# Patient Record
(demographics unavailable — no encounter records)

---

## 2025-05-27 NOTE — PROCEDURE
[Outpatient] : Outpatient [Ambulatory] : Patient is ambulatory. [____] : Pre-Dive: Time - [unfilled] [___] : Pre-Dive: Value - [unfilled] mg/dL [Patient demonstrated and verbalized proper technique for using air break mask] : Patient demonstrated and verbalized proper technique for using air break mask [Patient educated on the risks of SMOKING prior to HBOT with understanding] : Patient educated on the risks of SMOKING prior to HBOT with understanding [Patient educated on the risks of CONSUMING ALCOHOL prior to HBOT with understanding] : Patient educated on the risks of CONSUMING ALCOHOL prior to HBOT with understanding [100% Cotton] : 100% cotton [Empty all pockets] : empty all pockets [No hair oils, wigs, hairpieces, pins] : no hair oils, wigs, hairpieces, pins  [Pre tx medications] : pre tx medications  [No make-up, creams] : no make-up, creams  [No jewelry] : no jewelry  [No matches, cigarettes, lighters] : no matches, cigarettes, lighters  [Hearing aid removed] : hearing aid removed [Dentures removed] : dentures removed [Ground bracelet on pt's wrist] : ground bracelet on pt's wrist  [Contacts removed] : contacts removed  [Remove nail polish] : remove nail polish  [No reading material] : no reading material  [Bra, undergarments removed] : bra, undergarments removed  [No contraindicated dressings] : no contraindicated dressings [Ground Wire - VISUAL Verification - Intact/Free of Obstruction] : Ground Wire - VISUAL Verification - Intact/Free of Obstruction  [THIS CHAMBER HAS BEEN CLEANED / DISINFECTED] : This chamber has been cleaned / disinfected according to local and hospital policy and procedure prior to this treatment. [Number: ___] : Number: [unfilled] [Diagnosis: ___] : Diagnosis: [unfilled] [Clear all fields] : clear all fields [] : No [FreeTextEntry4] : 100 [FreeTextEntry6] : 4440 [FreeTextEntry8] : 9350 [de-identified] : 5800 [de-identified] : 4366 [de-identified] : 8082 [de-identified] : 6229 [de-identified] : 5372 [de-identified] : 0943 [de-identified] : 120 [de-identified] : MELANIE

## 2025-05-27 NOTE — PROCEDURE
[Outpatient] : Outpatient [Ambulatory] : Patient is ambulatory. [____] : Pre-Dive: Time - [unfilled] [___] : Pre-Dive: Value - [unfilled] mg/dL [Patient demonstrated and verbalized proper technique for using air break mask] : Patient demonstrated and verbalized proper technique for using air break mask [Patient educated on the risks of SMOKING prior to HBOT with understanding] : Patient educated on the risks of SMOKING prior to HBOT with understanding [Patient educated on the risks of CONSUMING ALCOHOL prior to HBOT with understanding] : Patient educated on the risks of CONSUMING ALCOHOL prior to HBOT with understanding [100% Cotton] : 100% cotton [Empty all pockets] : empty all pockets [No hair oils, wigs, hairpieces, pins] : no hair oils, wigs, hairpieces, pins  [Pre tx medications] : pre tx medications  [No make-up, creams] : no make-up, creams  [No jewelry] : no jewelry  [No matches, cigarettes, lighters] : no matches, cigarettes, lighters  [Hearing aid removed] : hearing aid removed [Dentures removed] : dentures removed [Ground bracelet on pt's wrist] : ground bracelet on pt's wrist  [Contacts removed] : contacts removed  [Remove nail polish] : remove nail polish  [No reading material] : no reading material  [Bra, undergarments removed] : bra, undergarments removed  [No contraindicated dressings] : no contraindicated dressings [Ground Wire - VISUAL Verification - Intact/Free of Obstruction] : Ground Wire - VISUAL Verification - Intact/Free of Obstruction  [THIS CHAMBER HAS BEEN CLEANED / DISINFECTED] : This chamber has been cleaned / disinfected according to local and hospital policy and procedure prior to this treatment. [Number: ___] : Number: [unfilled] [Diagnosis: ___] : Diagnosis: [unfilled] [Clear all fields] : clear all fields [] : No [FreeTextEntry4] : 100 [FreeTextEntry6] : 7480 [FreeTextEntry8] : 7920 [de-identified] : 3617 [de-identified] : 5820 [de-identified] : 7882 [de-identified] : 3719 [de-identified] : 0738 [de-identified] : 0152 [de-identified] : 120 [de-identified] : MELANIE

## 2025-05-27 NOTE — ADDENDUM
[FreeTextEntry1] : PT ALERT AND ENTERED INTO HYPERBARIC SUITE PT ORDER VERIFIED PRIOR TO TREATMENT PT CONTRAINDICATION LIST AND PRE-DIVE SAFETY CHECK DONE AND VERIFIED PRIOR TO TREATMENT BY TWO CHT PT GROUND BRACLET VERIFIED PLUGGED IN BY TWO TECHNICIANS PT VITALS WERE WITHIN MD DISCRETION FOR HBOT PT DENIES PAIN PT WAS PRESSURIZED TO 2.4 NAVARRO AT 2.2 PSI/MIN IN CHAMBER #4                       PT OBSERVED FOR FACIAL TWITCHING AND CHEST RISE BY CHT SEATED CHAMBERSIDE Patient Tolerated Both Air Breaks Well Patient Ascended to Surface Without Any Complications Patient Vitals Were Taken and Were Within Limits for Patient Post TX Patient Exited Hyperbaric Room Assisted by CHT

## 2025-05-28 NOTE — PHYSICAL EXAM
[Normal Thyroid] : the thyroid was normal [Normal Breath Sounds] : Normal breath sounds [Normal Heart Sounds] : normal heart sounds [Normal Rate and Rhythm] : normal rate and rhythm [Alert] : alert [Oriented to Person] : oriented to person [Oriented to Place] : oriented to place [Oriented to Time] : oriented to time [Calm] : calm [Purpura] : purpura [Abdominal Pad] : Abdominal Pad [JVD] : no jugular venous distention  [Abdomen Masses] : No abdominal massess [Abdomen Tenderness] : ~T ~M No abdominal tenderness [Tender] : nontender [Enlarged] : not enlarged [de-identified] : adult WF, NAD, alert, Ox3 [de-identified] : Ecchymosis surrounding the suture line [FreeTextEntry1] : Right chest- flap [FreeTextEntry2] : 20.5 [FreeTextEntry4] : well approximated edges [de-identified] : Ecchymosis [de-identified] : Unable to determine [de-identified] : ABD [de-identified] : Mechanically cleansed with 0.9& 5 normal saline and sterile 4x4 gauze Rolando Gupta drains in place Binder Bra [de-identified] : Left- midline suture with a dusky appearance and mild dehiscence. Ecchymosis surrounding the suture line [FreeTextEntry7] : Left chest -flap [FreeTextEntry8] : 22.0 [de-identified] : Lateral and medial suture line- well approximated Midline suture 0.1 [de-identified] : ecchymosis with slight dusky appearance [de-identified] : cannot determine [de-identified] : ABD [de-identified] : Mechanically cleansed with 0.9& 5 normal saline and sterile 4x4 gauze Rolando Gupta drains in place Binder Bra   [TWNoteComboBox4] : Small [TWNoteComboBox5] : No [TWNoteComboBox6] : Surgical [de-identified] : No [de-identified] : other [de-identified] : None [de-identified] : None [de-identified] : Daily [de-identified] : Other [de-identified] : Small [de-identified] : No [de-identified] : Surgical [de-identified] : No [de-identified] : other [de-identified] : None [de-identified] : None [de-identified] : Daily [de-identified] : Other

## 2025-05-28 NOTE — VITALS
[Pain related to present condition?] : The patient's  pain is related to present condition. [Aching] : aching [] : No [de-identified] : 2/10 [FreeTextEntry3] : Right and left breast [FreeTextEntry1] : support of the suture lines- Oxycodone [FreeTextEntry2] : nothing [FreeTextEntry5] : Medication reconciliation completed

## 2025-05-28 NOTE — ASSESSMENT
[Verbal] : Verbal [Written] : Written [Demo] : Demo [Patient] : Patient [Spouse] : Spouse [Verbalizes knowledge/Understanding] : Verbalizes knowledge/understanding [Dressing changes] : dressing changes [Skin Care] : skin care [Pressure relief] : pressure relief [Signs and symptoms of infection] : sign and symptoms of infection [Nutrition] : nutrition [How and When to Call] : how and when to call [Labs and Tests] : labs and tests [Pain Management] : pain management [Hyperbaric Therapy] : hyperbaric therapy [Patient responsibility to plan of care] : patient responsibility to plan of care [Glycemic Control] : glycemic control [] : Yes [Stable] : stable [Other: ____] : [unfilled] [Ambulatory] : Ambulatory [Not Applicable - Long Term Care/Home Health Agency] : Long Term Care/Home Health Agency: Not Applicable [Good - alert, interested, motivated] : Good - alert, interested, motivated [FreeTextEntry2] : Infection prevention Pain management Glycemic control Weight reduction Foot and nail care Restore skin integrity Low sodium diet Ambulation safety Regular f/u with primary medical team HBOT Hygiene   [FreeTextEntry3] : Initial visit [FreeTextEntry4] : Pt processed for hbot- compromised surgical flap- authorization submitted Pt sent to [facility] to complete the CXR-2 view and blood work. Orientation completed/ video viewed- by technician TM evaluation completed- results unremarkable per MD HBO consent/ Hbo pre procedure checklist completed - by technician Md order for hbot complete (2.4) Pt has bilateral Rolando Gupta drains in place (x2 on each side)- right ROMEL drain to be removed on 5/22/25 per pt Pt denies any implanted devices or seizure Hx. F/U 1 week pending hbo authorization- biweekly assessment once hbot begins

## 2025-05-28 NOTE — VITALS
[Pain related to present condition?] : The patient's  pain is related to present condition. [Aching] : aching [] : No [de-identified] : 2/10 [FreeTextEntry3] : Right and left breast [FreeTextEntry1] : support of the suture lines- Oxycodone [FreeTextEntry2] : nothing [FreeTextEntry5] : Medication reconciliation completed

## 2025-05-28 NOTE — ASSESSMENT
[Verbal] : Verbal [Written] : Written [Demo] : Demo [Patient] : Patient [Spouse] : Spouse [Verbalizes knowledge/Understanding] : Verbalizes knowledge/understanding [Dressing changes] : dressing changes [Skin Care] : skin care [Pressure relief] : pressure relief [Signs and symptoms of infection] : sign and symptoms of infection [Nutrition] : nutrition [How and When to Call] : how and when to call [Labs and Tests] : labs and tests [Pain Management] : pain management [Hyperbaric Therapy] : hyperbaric therapy [Patient responsibility to plan of care] : patient responsibility to plan of care [Glycemic Control] : glycemic control [] : Yes [Stable] : stable [Other: ____] : [unfilled] [Ambulatory] : Ambulatory [Not Applicable - Long Term Care/Home Health Agency] : Long Term Care/Home Health Agency: Not Applicable [Good - alert, interested, motivated] : Good - alert, interested, motivated [FreeTextEntry2] : Infection prevention Pain management Glycemic control Weight reduction Foot and nail care Restore skin integrity Low sodium diet Ambulation safety Regular f/u with primary medical team HBOT Hygiene   [FreeTextEntry3] : Initial visit [FreeTextEntry4] : Pt processed for hbot- compromised surgical flap- authorization submitted Pt sent to [facility] to complete the CXR-2 view and blood work. Orientation completed/ video viewed- by technician TM evaluation completed- results unremarkable per MD HBO consent/ Hbo pre procedure checklist completed - by technician Md order for hbot complete (2.4) Pt has bilateral Rolando Gupta drains in place (x2 on each side)- right ROMEL drain to be removed on 5/22/25 per pt Pt denies any implanted devices or seizure Hx. F/U 1 week pending hbo authorization- biweekly assessment once hbot begins

## 2025-05-28 NOTE — PHYSICAL EXAM
[Normal Thyroid] : the thyroid was normal [Normal Breath Sounds] : Normal breath sounds [Normal Heart Sounds] : normal heart sounds [Normal Rate and Rhythm] : normal rate and rhythm [Alert] : alert [Oriented to Person] : oriented to person [Oriented to Place] : oriented to place [Oriented to Time] : oriented to time [Calm] : calm [Purpura] : purpura [Abdominal Pad] : Abdominal Pad [JVD] : no jugular venous distention  [Abdomen Masses] : No abdominal massess [Abdomen Tenderness] : ~T ~M No abdominal tenderness [Tender] : nontender [Enlarged] : not enlarged [de-identified] : adult WF, NAD, alert, Ox3 [de-identified] : Ecchymosis surrounding the suture line [FreeTextEntry1] : Right chest- flap [FreeTextEntry2] : 20.5 [FreeTextEntry4] : well approximated edges [de-identified] : Ecchymosis [de-identified] : Unable to determine [de-identified] : ABD [de-identified] : Mechanically cleansed with 0.9& 5 normal saline and sterile 4x4 gauze Rolando Gupta drains in place Binder Bra [de-identified] : Left- midline suture with a dusky appearance and mild dehiscence. Ecchymosis surrounding the suture line [FreeTextEntry7] : Left chest -flap [FreeTextEntry8] : 22.0 [de-identified] : Lateral and medial suture line- well approximated Midline suture 0.1 [de-identified] : ecchymosis with slight dusky appearance [de-identified] : cannot determine [de-identified] : ABD [de-identified] : Mechanically cleansed with 0.9& 5 normal saline and sterile 4x4 gauze Rolando Gupta drains in place Binder Bra   [TWNoteComboBox4] : Small [TWNoteComboBox5] : No [TWNoteComboBox6] : Surgical [de-identified] : No [de-identified] : other [de-identified] : None [de-identified] : None [de-identified] : Daily [de-identified] : Other [de-identified] : Small [de-identified] : No [de-identified] : Surgical [de-identified] : No [de-identified] : other [de-identified] : None [de-identified] : None [de-identified] : Daily [de-identified] : Other

## 2025-05-28 NOTE — PLAN
[FreeTextEntry1] : HBO orientation today get authoriz for HBO and then begin HBO CXR (PA, lat) lab work    time spent 45 mins.

## 2025-05-28 NOTE — HISTORY OF PRESENT ILLNESS
[FreeTextEntry1] : 64 yo WF, referred her here for assessment for HBO tx. Pt underwent bilateral mastectomy/LN dissection about one wk ago. Pt had been dx with invasive ductal carcinoma recently. A mammogram done 3 mos ago was wnl according to the pt. But recently pt felt a "olive size lump" of 2cm on her left bx and a bx was + for CA. Bx done only on the left side. A MRI done prior to the bx revealed a suspicious lesion on the left side, but wnl on the right.  During her surgery, pt had an anaphylactic shock to the blue dye used to map out LN. LN bx path still not back yet. Genetic studies done and were wnl accoridng to the pt.  Bilateral expanders were placed along with 2 ROMEL drains on both sides which are still in place. On her left ant. chest wall, the middle 1/3 of the incision appears ecchymotic as well as dusky c/w a compromise flap. No SOI.       Pt with a compromised flap on the left breast. Discussed HBO, its purpose and its procedure. Discussed potential risks/complications associated with HBO including ear barotrauma, nasal sinus pain, and Oxygen toxicity leading to seizures. Pt's hx and surgeries reviewed with pt in depth.  Orientation video to be shown to pt now. Time given for Q&A.

## 2025-05-29 NOTE — PROCEDURE
[Number: ___] : Number: [unfilled] [Diagnosis: ___] : Diagnosis: [unfilled] [Outpatient] : Outpatient [Ambulatory] : Patient is ambulatory. [THIS CHAMBER HAS BEEN CLEANED / DISINFECTED] : This chamber has been cleaned / disinfected according to local and hospital policy and procedure prior to this treatment. [Patient demonstrated and verbalized proper technique for using air break mask] : Patient demonstrated and verbalized proper technique for using air break mask [Patient educated on the risks of SMOKING prior to HBOT with understanding] : Patient educated on the risks of SMOKING prior to HBOT with understanding [Patient educated on the risks of CONSUMING ALCOHOL prior to HBOT with understanding] : Patient educated on the risks of CONSUMING ALCOHOL prior to HBOT with understanding [100% Cotton] : 100% cotton [Empty all pockets] : empty all pockets [No hair oils, wigs, hairpieces, pins] : no hair oils, wigs, hairpieces, pins  [Pre tx medications] : pre tx medications  [No make-up, creams] : no make-up, creams  [No jewelry] : no jewelry  [No matches, cigarettes, lighters] : no matches, cigarettes, lighters  [Hearing aid removed] : hearing aid removed [Dentures removed] : dentures removed [Ground bracelet on pt's wrist] : ground bracelet on pt's wrist  [Contacts removed] : contacts removed  [Remove nail polish] : remove nail polish  [No reading material] : no reading material  [Bra, undergarments removed] : bra, undergarments removed  [No contraindicated dressings] : no contraindicated dressings [Ground Wire - VISUAL Verification - Intact/Free of Obstruction] : Ground Wire - VISUAL Verification - Intact/Free of Obstruction  [____] : Recheck: Time - [unfilled] [___] : Recheck: Value - [unfilled] mg/dL [Clear all fields] : clear all fields [] : No [FreeTextEntry4] : 100 [FreeTextEntry6] : 1019 [FreeTextEntry8] : 1029 [de-identified] : 9863 [de-identified] : 5466 [de-identified] : 1948 [de-identified] : 4865 [de-identified] : 1200 [de-identified] : 1218 [de-identified] : 120

## 2025-05-29 NOTE — ADDENDUM
[FreeTextEntry1] : Patient Entered Hyperbaric Room Alert and Ambulatory Patient Vitals Within Normal Limits for HBO Tx PT BGL WAS TOO LOW FOR HBOT, RN NOTIFIED, MD notified PT GIVEN 15 GRAMS OF GLUCOSE VIA 2 juices BGL RESTESTED AFTER 15 MINUTES Patient Contraindication List and Safety Check Completed and Verified by two CHTs Patient Dressing is Clean, Dry, and Intact Patient Denies Any Pain Patient Orders Verified Prior to Descent Patient Descended to 2.4 NAVARRO at a Rate of 2.2 PSI/min in CHAMBER #1 Patient Observed for Chest Rise, Facial Twitching, and Any Discomfort Throughout Tx Patient Tolerated Both Air Breaks Well Patient Ascended to Surface Without Any Complications Patient Vitals Were Taken and Were Within Limits for Patient Post TX Patient Exited Hyperbaric Room Assisted by CHT

## 2025-05-30 NOTE — PROCEDURE
[Outpatient] : Outpatient [Ambulatory] : Patient is ambulatory. [THIS CHAMBER HAS BEEN CLEANED / DISINFECTED] : This chamber has been cleaned / disinfected according to local and hospital policy and procedure prior to this treatment. [____] : Pre-Dive: Time - [unfilled] [___] : Pre-Dive: Value - [unfilled] mg/dL [Patient demonstrated and verbalized proper technique for using air break mask] : Patient demonstrated and verbalized proper technique for using air break mask [Patient educated on the risks of SMOKING prior to HBOT with understanding] : Patient educated on the risks of SMOKING prior to HBOT with understanding [Patient educated on the risks of CONSUMING ALCOHOL prior to HBOT with understanding] : Patient educated on the risks of CONSUMING ALCOHOL prior to HBOT with understanding [100% Cotton] : 100% cotton [Empty all pockets] : empty all pockets [No hair oils, wigs, hairpieces, pins] : no hair oils, wigs, hairpieces, pins  [Pre tx medications] : pre tx medications  [No make-up, creams] : no make-up, creams  [No jewelry] : no jewelry  [No matches, cigarettes, lighters] : no matches, cigarettes, lighters  [Hearing aid removed] : hearing aid removed [Dentures removed] : dentures removed [Ground bracelet on pt's wrist] : ground bracelet on pt's wrist  [Contacts removed] : contacts removed  [Remove nail polish] : remove nail polish  [No reading material] : no reading material  [Bra, undergarments removed] : bra, undergarments removed  [No contraindicated dressings] : no contraindicated dressings [Ground Wire - VISUAL Verification - Intact/Free of Obstruction] : Ground Wire - VISUAL Verification - Intact/Free of Obstruction  [Number: ___] : Number: [unfilled] [Diagnosis: ___] : Diagnosis: [unfilled] [Clear all fields] : clear all fields

## 2025-05-31 NOTE — ADDENDUM
[FreeTextEntry1] : Patient arrived safely to hyperbaric suite ambulatory Patient vitals were within safe parameters for hyperbaric oxygen therapy. Patient pre safety check list performed by two technicians prior to hyperbaric oxygen therapy.. Patient Orders Verified Prior to Descent Patient descended to 2.4 jostin at 2.2 psi/min without incident in chamber #3 Patient resting comfortably at treatment depth, with equal chest rise throughout treatment Patient has no complains of pain or discomfort. Patient tolerated both air breaks without issue. Patient ascended from 2.4 jostin at 2.2 psi/min without incident in chamber #3 Patient exited hyperbaric suite without incident. Patient has no complains of pain or discomfort.

## 2025-05-31 NOTE — PROCEDURE
[Outpatient] : Outpatient [Ambulatory] : Patient is ambulatory. [THIS CHAMBER HAS BEEN CLEANED / DISINFECTED] : This chamber has been cleaned / disinfected according to local and hospital policy and procedure prior to this treatment. [____] : Pre-Dive: Time - [unfilled] [___] : Pre-Dive: Value - [unfilled] mg/dL [Patient demonstrated and verbalized proper technique for using air break mask] : Patient demonstrated and verbalized proper technique for using air break mask [Patient educated on the risks of SMOKING prior to HBOT with understanding] : Patient educated on the risks of SMOKING prior to HBOT with understanding [Patient educated on the risks of CONSUMING ALCOHOL prior to HBOT with understanding] : Patient educated on the risks of CONSUMING ALCOHOL prior to HBOT with understanding [100% Cotton] : 100% cotton [Empty all pockets] : empty all pockets [No hair oils, wigs, hairpieces, pins] : no hair oils, wigs, hairpieces, pins  [Pre tx medications] : pre tx medications  [No make-up, creams] : no make-up, creams  [No jewelry] : no jewelry  [No matches, cigarettes, lighters] : no matches, cigarettes, lighters  [Hearing aid removed] : hearing aid removed [Dentures removed] : dentures removed [Ground bracelet on pt's wrist] : ground bracelet on pt's wrist  [Contacts removed] : contacts removed  [Remove nail polish] : remove nail polish  [No reading material] : no reading material  [Bra, undergarments removed] : bra, undergarments removed  [No contraindicated dressings] : no contraindicated dressings [Ground Wire - VISUAL Verification - Intact/Free of Obstruction] : Ground Wire - VISUAL Verification - Intact/Free of Obstruction  [Number: ___] : Number: [unfilled] [Diagnosis: ___] : Diagnosis: [unfilled] [Clear all fields] : clear all fields [] : No [FreeTextEntry4] : 100 [FreeTextEntry2] : Chamber 3 [FreeTextEntry6] : 016 [FreeTextEntry8] : 311 [de-identified] : 335 [de-identified] : 289 [de-identified] : 871 [de-identified] : 794 [de-identified] : 308 [de-identified] : 118 [de-identified] : 120

## 2025-06-02 NOTE — PROCEDURE
[Outpatient] : Outpatient [Ambulatory] : Patient is ambulatory. [THIS CHAMBER HAS BEEN CLEANED / DISINFECTED] : This chamber has been cleaned / disinfected according to local and hospital policy and procedure prior to this treatment. [____] : Pre-Dive: Time - [unfilled] [___] : Pre-Dive: Value - [unfilled] mg/dL [Patient demonstrated and verbalized proper technique for using air break mask] : Patient demonstrated and verbalized proper technique for using air break mask [Patient educated on the risks of SMOKING prior to HBOT with understanding] : Patient educated on the risks of SMOKING prior to HBOT with understanding [Patient educated on the risks of CONSUMING ALCOHOL prior to HBOT with understanding] : Patient educated on the risks of CONSUMING ALCOHOL prior to HBOT with understanding [100% Cotton] : 100% cotton [Empty all pockets] : empty all pockets [No hair oils, wigs, hairpieces, pins] : no hair oils, wigs, hairpieces, pins  [Pre tx medications] : pre tx medications  [No make-up, creams] : no make-up, creams  [No jewelry] : no jewelry  [No matches, cigarettes, lighters] : no matches, cigarettes, lighters  [Hearing aid removed] : hearing aid removed [Dentures removed] : dentures removed [Ground bracelet on pt's wrist] : ground bracelet on pt's wrist  [Contacts removed] : contacts removed  [Remove nail polish] : remove nail polish  [No reading material] : no reading material  [Bra, undergarments removed] : bra, undergarments removed  [No contraindicated dressings] : no contraindicated dressings [Ground Wire - VISUAL Verification - Intact/Free of Obstruction] : Ground Wire - VISUAL Verification - Intact/Free of Obstruction  [Number: ___] : Number: [unfilled] [Diagnosis: ___] : Diagnosis: [unfilled] [Clear all fields] : clear all fields [] : No [FreeTextEntry4] : 100 [FreeTextEntry6] : 5190 [FreeTextEntry8] : 3937 [de-identified] : 102 [de-identified] : 1021 [de-identified] : 1058 [de-identified] : 1109 [de-identified] : 3297 [de-identified] : 9000 [de-identified] : 120

## 2025-06-02 NOTE — PROCEDURE
[Outpatient] : Outpatient [Ambulatory] : Patient is ambulatory. [Number: ___] : Number: [unfilled] [Diagnosis: ___] : Diagnosis: [unfilled] [____] : Pre-Dive: Time - [unfilled] [___] : Pre-Dive: Value - [unfilled] mg/dL [Patient demonstrated and verbalized proper technique for using air break mask] : Patient demonstrated and verbalized proper technique for using air break mask [Patient educated on the risks of SMOKING prior to HBOT with understanding] : Patient educated on the risks of SMOKING prior to HBOT with understanding [Patient educated on the risks of CONSUMING ALCOHOL prior to HBOT with understanding] : Patient educated on the risks of CONSUMING ALCOHOL prior to HBOT with understanding [100% Cotton] : 100% cotton [Empty all pockets] : empty all pockets [No hair oils, wigs, hairpieces, pins] : no hair oils, wigs, hairpieces, pins  [Pre tx medications] : pre tx medications  [No make-up, creams] : no make-up, creams  [No jewelry] : no jewelry  [No matches, cigarettes, lighters] : no matches, cigarettes, lighters  [Hearing aid removed] : hearing aid removed [Dentures removed] : dentures removed [Ground bracelet on pt's wrist] : ground bracelet on pt's wrist  [Contacts removed] : contacts removed  [Remove nail polish] : remove nail polish  [No reading material] : no reading material  [Bra, undergarments removed] : bra, undergarments removed  [No contraindicated dressings] : no contraindicated dressings [Ground Wire - VISUAL Verification - Intact/Free of Obstruction] : Ground Wire - VISUAL Verification - Intact/Free of Obstruction  [THIS CHAMBER HAS BEEN CLEANED / DISINFECTED] : This chamber has been cleaned / disinfected according to local and hospital policy and procedure prior to this treatment. [Clear all fields] : clear all fields [] : No [FreeTextEntry2] : Chamber 1 [FreeTextEntry4] : 100 [FreeTextEntry6] : 2923 [FreeTextEntry8] : 0916 [de-identified] : 3211 [de-identified] : 8442 [de-identified] : 9287 [de-identified] : 1522 [de-identified] : 0970 [de-identified] : 8101 [de-identified] : 120

## 2025-06-02 NOTE — PROCEDURE
[Outpatient] : Outpatient [Ambulatory] : Patient is ambulatory. [Number: ___] : Number: [unfilled] [Diagnosis: ___] : Diagnosis: [unfilled] [____] : Pre-Dive: Time - [unfilled] [___] : Pre-Dive: Value - [unfilled] mg/dL [Patient demonstrated and verbalized proper technique for using air break mask] : Patient demonstrated and verbalized proper technique for using air break mask [Patient educated on the risks of SMOKING prior to HBOT with understanding] : Patient educated on the risks of SMOKING prior to HBOT with understanding [Patient educated on the risks of CONSUMING ALCOHOL prior to HBOT with understanding] : Patient educated on the risks of CONSUMING ALCOHOL prior to HBOT with understanding [100% Cotton] : 100% cotton [Empty all pockets] : empty all pockets [No hair oils, wigs, hairpieces, pins] : no hair oils, wigs, hairpieces, pins  [Pre tx medications] : pre tx medications  [No make-up, creams] : no make-up, creams  [No jewelry] : no jewelry  [No matches, cigarettes, lighters] : no matches, cigarettes, lighters  [Hearing aid removed] : hearing aid removed [Dentures removed] : dentures removed [Ground bracelet on pt's wrist] : ground bracelet on pt's wrist  [Contacts removed] : contacts removed  [Remove nail polish] : remove nail polish  [No reading material] : no reading material  [Bra, undergarments removed] : bra, undergarments removed  [No contraindicated dressings] : no contraindicated dressings [Ground Wire - VISUAL Verification - Intact/Free of Obstruction] : Ground Wire - VISUAL Verification - Intact/Free of Obstruction  [THIS CHAMBER HAS BEEN CLEANED / DISINFECTED] : This chamber has been cleaned / disinfected according to local and hospital policy and procedure prior to this treatment. [Clear all fields] : clear all fields [] : No [FreeTextEntry2] : Chamber 1 [FreeTextEntry4] : 100 [FreeTextEntry6] : 9145 [FreeTextEntry8] : 5884 [de-identified] : 2921 [de-identified] : 1301 [de-identified] : 3058 [de-identified] : 1523 [de-identified] : 7067 [de-identified] : 9662 [de-identified] : 120

## 2025-06-02 NOTE — ADDENDUM
[FreeTextEntry1] : Patient Alert & Oriented Ambulatory  Patient's Order Verified Prior to HBOT Patient Contradiction and Pre Checklist Verified and Signed prior to HBOT Patient expressed comfort and denied pain  Patient's Vitals were within parameters for HBOT  Patients dressing was clean dry and intact Patient descended to 2.4 NAVARRO @ 2.2 PSI/MIN without incident in chamber 2 Patient Resting @ depth and observed for with chest rise, facial twitching and any discomfort chamber side. Patients Care Transferred to [facility] at 1008, Patients Situation Background Assessment discussed Patient Tolerated Both Air Breaks Well Patient Ascended to Surface Without Any Complications Patient Vitals Were Taken and Were Within Limits for Patient Post TX Patient Exited Hyperbaric Room Assisted by [facility]

## 2025-06-02 NOTE — ADDENDUM
[FreeTextEntry1] : Patient arrived safely to hyperbaric suite ambulatory Patient vitals were within safe parameters for hyperbaric oxygen therapy. Patient pre safety check list performed by two technicians prior to hyperbaric oxygen therapy.. Patient Orders Verified Prior to Descent Patient descended to 2.4 jostin at 2.2 psi/min without incident in chamber #4 Patient resting comfortably at treatment depth, with equal chest rise throughout treatment Patient has no complains of pain or discomfort. Patient Tolerated Both Air Breaks Well Patient Ascended to Surface Without Any Complications Patient Vitals Were Taken and Were Within Limits for Patient Post TX Patient Exited Hyperbaric Room Assisted by [facility]

## 2025-06-04 NOTE — ADDENDUM
[FreeTextEntry1] : PT Alert & Oriented Ambulatory PT Order Verified Prior to HBOT PT Contradiction and Pre Checklist Verified and Signed prior to HBOT PT expressed comfort and denied pain  PT Vitals were within parameters for HBOT PT BGL was not within parameters for HBOT // Notified RN // Patient given 15g of glucose // PT was rechecked  PT glucose was rechecked and cleared by MD for HBOT PT descended to 2.4 NAVARRO @ 2.2 PSI/MIN without incident in chamber 1 PT Resting @ depth and observed for with chest rise, facial twitching and any discomfort chamber side. PT Tolerated Air Brakes  PT Ascended from 2.4 NAVARRO @ 2.2 PSI/MIN without incident in chamber 1 PT tolerated treatment PT exited hyperbaric suit safely by CHT  .        .

## 2025-06-04 NOTE — ADDENDUM
[FreeTextEntry1] : PT ALERT AND ENTERED INTO HYPERBARIC SUITE PT ORDER VERIFIED PRIOR TO TREATMENT PT CONTRAINDICATION LIST AND PRE-DIVE SAFETY CHECK DONE AND VERIFIED PRIOR TO TREATMENT BY TWO CHT PT GROUND BRACLET VERIFIED PLUGGED IN BY TWO TECHNICIANS PT VITALS WERE WITHIN PARAMETERS FOR HBOT PT DENIES PAIN PT WAS PRESSURIZED TO 2.4 NAVARRO AT 2.2 PSI/MIN IN CHAMBER #4 PT OBSERVED FOR FACIAL TWITCHING AND CHEST RISE BY CHT SEATED CHAMBERSIDE PT TOLERATED AIR BREAKS PT WAS DEPRESSURIZED FROM TX PRESSURE OF 2.4 NAVARRO @ 2.2 PSI/MIN PT TOLERATED TREATMENT PT EXITED HYPERBARIC SUITE SAFELY ACCOMPANIED BY CHT

## 2025-06-04 NOTE — PROCEDURE
[Outpatient] : Outpatient [Ambulatory] : Patient is ambulatory. [THIS CHAMBER HAS BEEN CLEANED / DISINFECTED] : This chamber has been cleaned / disinfected according to local and hospital policy and procedure prior to this treatment. [___] : Pre-Dive: Value - [unfilled] mg/dL [Patient demonstrated and verbalized proper technique for using air break mask] : Patient demonstrated and verbalized proper technique for using air break mask [Patient educated on the risks of SMOKING prior to HBOT with understanding] : Patient educated on the risks of SMOKING prior to HBOT with understanding [Patient educated on the risks of CONSUMING ALCOHOL prior to HBOT with understanding] : Patient educated on the risks of CONSUMING ALCOHOL prior to HBOT with understanding [100% Cotton] : 100% cotton [Empty all pockets] : empty all pockets [No hair oils, wigs, hairpieces, pins] : no hair oils, wigs, hairpieces, pins  [Pre tx medications] : pre tx medications  [No make-up, creams] : no make-up, creams  [No jewelry] : no jewelry  [No matches, cigarettes, lighters] : no matches, cigarettes, lighters  [Hearing aid removed] : hearing aid removed [Dentures removed] : dentures removed [Ground bracelet on pt's wrist] : ground bracelet on pt's wrist  [Contacts removed] : contacts removed  [Remove nail polish] : remove nail polish  [No reading material] : no reading material  [Bra, undergarments removed] : bra, undergarments removed  [No contraindicated dressings] : no contraindicated dressings [Ground Wire - VISUAL Verification - Intact/Free of Obstruction] : Ground Wire - VISUAL Verification - Intact/Free of Obstruction  [Number: ___] : Number: [unfilled] [Diagnosis: ___] : Diagnosis: [unfilled] [____] : Post-Dive: Time - [unfilled] [Clear all fields] : clear all fields [] : No [FreeTextEntry4] : 100 [FreeTextEntry6] : 1267 [FreeTextEntry8] : 3383 [de-identified] : 1374 [de-identified] : 0888 [de-identified] : 0985 [de-identified] : 5402 [de-identified] : 3559 [de-identified] : 1005 [de-identified] : 120 MIN

## 2025-06-04 NOTE — PROCEDURE
[Outpatient] : Outpatient [Ambulatory] : Patient is ambulatory. [THIS CHAMBER HAS BEEN CLEANED / DISINFECTED] : This chamber has been cleaned / disinfected according to local and hospital policy and procedure prior to this treatment. [____] : Recheck: Time - [unfilled] [Patient educated on the risks of SMOKING prior to HBOT with understanding] : Patient educated on the risks of SMOKING prior to HBOT with understanding [Patient demonstrated and verbalized proper technique for using air break mask] : Patient demonstrated and verbalized proper technique for using air break mask [Patient educated on the risks of CONSUMING ALCOHOL prior to HBOT with understanding] : Patient educated on the risks of CONSUMING ALCOHOL prior to HBOT with understanding [100% Cotton] : 100% cotton [Empty all pockets] : empty all pockets [No hair oils, wigs, hairpieces, pins] : no hair oils, wigs, hairpieces, pins  [Pre tx medications] : pre tx medications  [No make-up, creams] : no make-up, creams  [No jewelry] : no jewelry  [No matches, cigarettes, lighters] : no matches, cigarettes, lighters  [Hearing aid removed] : hearing aid removed [Dentures removed] : dentures removed [Ground bracelet on pt's wrist] : ground bracelet on pt's wrist  [Contacts removed] : contacts removed  [Remove nail polish] : remove nail polish  [No reading material] : no reading material  [Bra, undergarments removed] : bra, undergarments removed  [No contraindicated dressings] : no contraindicated dressings [Ground Wire - VISUAL Verification - Intact/Free of Obstruction] : Ground Wire - VISUAL Verification - Intact/Free of Obstruction  [Number: ___] : Number: [unfilled] [Diagnosis: ___] : Diagnosis: [unfilled] [___] : Recheck: Value - [unfilled] mg/dL [Clear all fields] : clear all fields [] : No [FreeTextEntry4] : 100 [FreeTextEntry6] : 1022 [FreeTextEntry8] : 1031 [de-identified] : 1100 [de-identified] : 1103 [de-identified] : 8744 [de-identified] : 3289 [de-identified] : 1211 [de-identified] : 0078 [de-identified] : 120

## 2025-06-04 NOTE — PROCEDURE
[Outpatient] : Outpatient [Ambulatory] : Patient is ambulatory. [THIS CHAMBER HAS BEEN CLEANED / DISINFECTED] : This chamber has been cleaned / disinfected according to local and hospital policy and procedure prior to this treatment. [____] : Recheck: Time - [unfilled] [Patient demonstrated and verbalized proper technique for using air break mask] : Patient demonstrated and verbalized proper technique for using air break mask [Patient educated on the risks of SMOKING prior to HBOT with understanding] : Patient educated on the risks of SMOKING prior to HBOT with understanding [Patient educated on the risks of CONSUMING ALCOHOL prior to HBOT with understanding] : Patient educated on the risks of CONSUMING ALCOHOL prior to HBOT with understanding [100% Cotton] : 100% cotton [Empty all pockets] : empty all pockets [No hair oils, wigs, hairpieces, pins] : no hair oils, wigs, hairpieces, pins  [Pre tx medications] : pre tx medications  [No make-up, creams] : no make-up, creams  [No jewelry] : no jewelry  [No matches, cigarettes, lighters] : no matches, cigarettes, lighters  [Hearing aid removed] : hearing aid removed [Dentures removed] : dentures removed [Ground bracelet on pt's wrist] : ground bracelet on pt's wrist  [Contacts removed] : contacts removed  [Remove nail polish] : remove nail polish  [No reading material] : no reading material  [Bra, undergarments removed] : bra, undergarments removed  [No contraindicated dressings] : no contraindicated dressings [Ground Wire - VISUAL Verification - Intact/Free of Obstruction] : Ground Wire - VISUAL Verification - Intact/Free of Obstruction  [Number: ___] : Number: [unfilled] [Diagnosis: ___] : Diagnosis: [unfilled] [___] : Recheck: Value - [unfilled] mg/dL [Clear all fields] : clear all fields [] : No [FreeTextEntry4] : 100 [FreeTextEntry6] : 1023 [FreeTextEntry8] : 1031 [de-identified] : 1100 [de-identified] : 1109 [de-identified] : 6738 [de-identified] : 8149 [de-identified] : 1211 [de-identified] : 5866 [de-identified] : 120

## 2025-06-05 NOTE — PROCEDURE
[____] : Pre-Dive: Time - [unfilled] [___] : Pre-Dive: Value - [unfilled] mg/dL [Outpatient] : Outpatient [THIS CHAMBER HAS BEEN CLEANED / DISINFECTED] : This chamber has been cleaned / disinfected according to local and hospital policy and procedure prior to this treatment. [Patient demonstrated and verbalized proper technique for using air break mask] : Patient demonstrated and verbalized proper technique for using air break mask [Patient educated on the risks of SMOKING prior to HBOT with understanding] : Patient educated on the risks of SMOKING prior to HBOT with understanding [Patient educated on the risks of CONSUMING ALCOHOL prior to HBOT with understanding] : Patient educated on the risks of CONSUMING ALCOHOL prior to HBOT with understanding [100% Cotton] : 100% cotton [Empty all pockets] : empty all pockets [No hair oils, wigs, hairpieces, pins] : no hair oils, wigs, hairpieces, pins  [Pre tx medications] : pre tx medications  [No make-up, creams] : no make-up, creams  [No jewelry] : no jewelry  [No matches, cigarettes, lighters] : no matches, cigarettes, lighters  [Hearing aid removed] : hearing aid removed [Dentures removed] : dentures removed [Ground bracelet on pt's wrist] : ground bracelet on pt's wrist  [Contacts removed] : contacts removed  [Remove nail polish] : remove nail polish  [No reading material] : no reading material  [Bra, undergarments removed] : bra, undergarments removed  [No contraindicated dressings] : no contraindicated dressings [Ground Wire - VISUAL Verification - Intact/Free of Obstruction] : Ground Wire - VISUAL Verification - Intact/Free of Obstruction  [Number: ___] : Number: [unfilled] [Diagnosis: ___] : Diagnosis: [unfilled] [Ambulatory] : Patient is ambulatory. [Clear all fields] : clear all fields [] : No [FreeTextEntry4] : 100 [FreeTextEntry6] : 1784 [FreeTextEntry8] : 8942 [de-identified] : 5130 [de-identified] : 2770 [de-identified] : 2107 [de-identified] : 6946 [de-identified] : 2988 [de-identified] : 1002 [de-identified] : 120 MIN

## 2025-06-05 NOTE — PROCEDURE
[____] : Pre-Dive: Time - [unfilled] [___] : Pre-Dive: Value - [unfilled] mg/dL [Outpatient] : Outpatient [THIS CHAMBER HAS BEEN CLEANED / DISINFECTED] : This chamber has been cleaned / disinfected according to local and hospital policy and procedure prior to this treatment. [Patient demonstrated and verbalized proper technique for using air break mask] : Patient demonstrated and verbalized proper technique for using air break mask [Patient educated on the risks of SMOKING prior to HBOT with understanding] : Patient educated on the risks of SMOKING prior to HBOT with understanding [Patient educated on the risks of CONSUMING ALCOHOL prior to HBOT with understanding] : Patient educated on the risks of CONSUMING ALCOHOL prior to HBOT with understanding [100% Cotton] : 100% cotton [Empty all pockets] : empty all pockets [No hair oils, wigs, hairpieces, pins] : no hair oils, wigs, hairpieces, pins  [Pre tx medications] : pre tx medications  [No make-up, creams] : no make-up, creams  [No jewelry] : no jewelry  [No matches, cigarettes, lighters] : no matches, cigarettes, lighters  [Hearing aid removed] : hearing aid removed [Dentures removed] : dentures removed [Ground bracelet on pt's wrist] : ground bracelet on pt's wrist  [Contacts removed] : contacts removed  [Remove nail polish] : remove nail polish  [No reading material] : no reading material  [Bra, undergarments removed] : bra, undergarments removed  [No contraindicated dressings] : no contraindicated dressings [Ground Wire - VISUAL Verification - Intact/Free of Obstruction] : Ground Wire - VISUAL Verification - Intact/Free of Obstruction  [Number: ___] : Number: [unfilled] [Diagnosis: ___] : Diagnosis: [unfilled] [Ambulatory] : Patient is ambulatory. [Clear all fields] : clear all fields [] : No [FreeTextEntry4] : 100 [FreeTextEntry6] : 3273 [FreeTextEntry8] : 1651 [de-identified] : 3200 [de-identified] : 9995 [de-identified] : 1046 [de-identified] : 3490 [de-identified] : 3197 [de-identified] : 1007 [de-identified] : 120 MIN

## 2025-06-05 NOTE — PROCEDURE
[____] : Pre-Dive: Time - [unfilled] [___] : Pre-Dive: Value - [unfilled] mg/dL [Outpatient] : Outpatient [THIS CHAMBER HAS BEEN CLEANED / DISINFECTED] : This chamber has been cleaned / disinfected according to local and hospital policy and procedure prior to this treatment. [Patient demonstrated and verbalized proper technique for using air break mask] : Patient demonstrated and verbalized proper technique for using air break mask [Patient educated on the risks of SMOKING prior to HBOT with understanding] : Patient educated on the risks of SMOKING prior to HBOT with understanding [Patient educated on the risks of CONSUMING ALCOHOL prior to HBOT with understanding] : Patient educated on the risks of CONSUMING ALCOHOL prior to HBOT with understanding [100% Cotton] : 100% cotton [Empty all pockets] : empty all pockets [No hair oils, wigs, hairpieces, pins] : no hair oils, wigs, hairpieces, pins  [Pre tx medications] : pre tx medications  [No make-up, creams] : no make-up, creams  [No jewelry] : no jewelry  [No matches, cigarettes, lighters] : no matches, cigarettes, lighters  [Hearing aid removed] : hearing aid removed [Dentures removed] : dentures removed [Ground bracelet on pt's wrist] : ground bracelet on pt's wrist  [Contacts removed] : contacts removed  [Remove nail polish] : remove nail polish  [No reading material] : no reading material  [Bra, undergarments removed] : bra, undergarments removed  [No contraindicated dressings] : no contraindicated dressings [Ground Wire - VISUAL Verification - Intact/Free of Obstruction] : Ground Wire - VISUAL Verification - Intact/Free of Obstruction  [Number: ___] : Number: [unfilled] [Diagnosis: ___] : Diagnosis: [unfilled] [Ambulatory] : Patient is ambulatory. [Clear all fields] : clear all fields [] : No [FreeTextEntry4] : 100 [FreeTextEntry6] : 3121 [FreeTextEntry8] : 8126 [de-identified] : 2308 [de-identified] : 0459 [de-identified] : 6745 [de-identified] : 9316 [de-identified] : 4802 [de-identified] : 1006 [de-identified] : 120 MIN

## 2025-06-06 NOTE — PROCEDURE
[Outpatient] : Outpatient [Ambulatory] : Patient is ambulatory. [THIS CHAMBER HAS BEEN CLEANED / DISINFECTED] : This chamber has been cleaned / disinfected according to local and hospital policy and procedure prior to this treatment. [____] : Pre-Dive: Time - [unfilled] [___] : Pre-Dive: Value - [unfilled] mg/dL [Patient demonstrated and verbalized proper technique for using air break mask] : Patient demonstrated and verbalized proper technique for using air break mask [Patient educated on the risks of SMOKING prior to HBOT with understanding] : Patient educated on the risks of SMOKING prior to HBOT with understanding [Patient educated on the risks of CONSUMING ALCOHOL prior to HBOT with understanding] : Patient educated on the risks of CONSUMING ALCOHOL prior to HBOT with understanding [100% Cotton] : 100% cotton [Empty all pockets] : empty all pockets [No hair oils, wigs, hairpieces, pins] : no hair oils, wigs, hairpieces, pins  [Pre tx medications] : pre tx medications  [No make-up, creams] : no make-up, creams  [No jewelry] : no jewelry  [No matches, cigarettes, lighters] : no matches, cigarettes, lighters  [Hearing aid removed] : hearing aid removed [Dentures removed] : dentures removed [Ground bracelet on pt's wrist] : ground bracelet on pt's wrist  [Contacts removed] : contacts removed  [Remove nail polish] : remove nail polish  [No reading material] : no reading material  [Bra, undergarments removed] : bra, undergarments removed  [No contraindicated dressings] : no contraindicated dressings [Ground Wire - VISUAL Verification - Intact/Free of Obstruction] : Ground Wire - VISUAL Verification - Intact/Free of Obstruction  [Number: ___] : Number: [unfilled] [Diagnosis: ___] : Diagnosis: [unfilled] [Clear all fields] : clear all fields [] : No [FreeTextEntry2] : CHAMBER 4 [FreeTextEntry4] : 100 [FreeTextEntry6] : 176 [FreeTextEntry8] : 452 [de-identified] : 749 [de-identified] : 779 [de-identified] : 176 [de-identified] : 045 [de-identified] : 313 [de-identified] : 597 [de-identified] : 120

## 2025-06-06 NOTE — ADDENDUM
[FreeTextEntry1] : Pt arrived to HBOT suite ambulatory Pt order verified prior to tx Pt vitals and BGL within acceptable limits Pt checked by 2 technicians Pt denies any pain or discomfort Pt descended to 2.4 NAVARRO @2.2 psi/min Pt @ depth without incident Pt resting comfortably with visible chest rise Pt tolerated both air breaks Pt ascended to surface @2.2 psi/min Pt @ surface without incident Pt exited HBOT suite ambulatory

## 2025-06-09 NOTE — ADDENDUM
[FreeTextEntry1] : PT ALERT AND ENTERED INTO HYPERBARIC SUITE PT ORDER VERIFIED PRIOR TO TREATMENT PT CONTRAINDICATION LIST AND PRE-DIVE SAFETY CHECK DONE AND VERIFIED PRIOR TO TREATMENT BY TWO CHT PT GROUND BRACLET VERIFIED PLUGGED IN BY TWO TECHNICIANS PT VITALS WERE WITHIN PARAMETERS FOR HBOT PT DENIES PAIN PT WAS PRESSURIZED TO 2.4 NAVARRO AT 2.2 PSI/MIN IN CHAMBER #4 PT OBSERVED FOR FACIAL TWITCHING AND CHEST RISE BY CHT SEATED CHAMBERSIDE PT TOLERATED AIR BREAKS PT WAS DEPRESSURIZED FROM TX PRESSURE OF 2.4 NAVARRO @ 2.2 PSI/MIN PT TOLERATED TREATMENT DURING DEPRESSURIZATION PT BEGAN BURPING AND DURING BURPING EPISODE HAD SMALL VOMITING EPISODE PT HAD COVERED MOUTH PRIOR TO EXPELLING ANY VOMITUS (SHE FELT IT COMING DURING BURP) AND LIMITED CHT FROM SEEING EXACT AMOUNT // RN INFORMED // MD INFORMED PT BP WAS ELEVATED POST TX // RN INFORMED AND CHT WAS INSTRUCTED TO TAKE BP AGAIN AFTER A FEW MINS BP RECHECK WAS WITHIN LIMITS  PT EXITED HYPERBARIC SUITE SAFELY ACCOMPANIED BY CHT

## 2025-06-09 NOTE — PROCEDURE
[____] : Pre-Dive: Time - [unfilled] [___] : Pre-Dive: Value - [unfilled] mg/dL [Patient demonstrated and verbalized proper technique for using air break mask] : Patient demonstrated and verbalized proper technique for using air break mask [Patient educated on the risks of SMOKING prior to HBOT with understanding] : Patient educated on the risks of SMOKING prior to HBOT with understanding [Patient educated on the risks of CONSUMING ALCOHOL prior to HBOT with understanding] : Patient educated on the risks of CONSUMING ALCOHOL prior to HBOT with understanding [100% Cotton] : 100% cotton [Empty all pockets] : empty all pockets [No hair oils, wigs, hairpieces, pins] : no hair oils, wigs, hairpieces, pins  [Pre tx medications] : pre tx medications  [No make-up, creams] : no make-up, creams  [No jewelry] : no jewelry  [No matches, cigarettes, lighters] : no matches, cigarettes, lighters  [Hearing aid removed] : hearing aid removed [Dentures removed] : dentures removed [Ground bracelet on pt's wrist] : ground bracelet on pt's wrist  [Contacts removed] : contacts removed  [Remove nail polish] : remove nail polish  [No reading material] : no reading material  [Bra, undergarments removed] : bra, undergarments removed  [No contraindicated dressings] : no contraindicated dressings [Ground Wire - VISUAL Verification - Intact/Free of Obstruction] : Ground Wire - VISUAL Verification - Intact/Free of Obstruction  [Number: ___] : Number: [unfilled] [Diagnosis: ___] : Diagnosis: [unfilled] [Outpatient] : Outpatient [Ambulatory] : Patient is ambulatory. [THIS CHAMBER HAS BEEN CLEANED / DISINFECTED] : This chamber has been cleaned / disinfected according to local and hospital policy and procedure prior to this treatment. [Clear all fields] : clear all fields [] : No [FreeTextEntry4] : 100 [FreeTextEntry6] : 7069 [FreeTextEntry8] : 7151 [de-identified] : 7908 [de-identified] : 3207 [de-identified] : 2217 [de-identified] : 7732 [de-identified] : 8902 [de-identified] : 120 MIN [de-identified] : 1000

## 2025-06-10 NOTE — PROCEDURE
[Outpatient] : Outpatient [Patient demonstrated and verbalized proper technique for using air break mask] : Patient demonstrated and verbalized proper technique for using air break mask [Ambulatory] : Patient is ambulatory. [Patient educated on the risks of CONSUMING ALCOHOL prior to HBOT with understanding] : Patient educated on the risks of CONSUMING ALCOHOL prior to HBOT with understanding [Patient educated on the risks of SMOKING prior to HBOT with understanding] : Patient educated on the risks of SMOKING prior to HBOT with understanding [100% Cotton] : 100% cotton [Empty all pockets] : empty all pockets [No hair oils, wigs, hairpieces, pins] : no hair oils, wigs, hairpieces, pins  [Pre tx medications] : pre tx medications  [No jewelry] : no jewelry  [No make-up, creams] : no make-up, creams  [No matches, cigarettes, lighters] : no matches, cigarettes, lighters  [Hearing aid removed] : hearing aid removed [Dentures removed] : dentures removed [Contacts removed] : contacts removed  [Ground bracelet on pt's wrist] : ground bracelet on pt's wrist  [Remove nail polish] : remove nail polish  [No reading material] : no reading material  [Bra, undergarments removed] : bra, undergarments removed  [No contraindicated dressings] : no contraindicated dressings [Ground Wire - VISUAL Verification - Intact/Free of Obstruction] : Ground Wire - VISUAL Verification - Intact/Free of Obstruction  [____] : Pre-Dive: Time - [unfilled] [___] : Pre-Dive: Value - [unfilled] mg/dL [Number: ___] : Number: [unfilled] [Diagnosis: ___] : Diagnosis: [unfilled] [THIS CHAMBER HAS BEEN CLEANED / DISINFECTED] : This chamber has been cleaned / disinfected according to local and hospital policy and procedure prior to this treatment. [Clear all fields] : clear all fields [] : No [FreeTextEntry4] : 100 [FreeTextEntry6] : 8126 [FreeTextEntry8] : 2372 [de-identified] : 4657 [de-identified] : 0866 [de-identified] : 0976 [de-identified] : 4731 [de-identified] : 3414 [de-identified] : 1008 [de-identified] : 120 min

## 2025-06-10 NOTE — ADDENDUM
[FreeTextEntry1] : Patient Alert & Oriented Ambulatory  Patient's Order Verified Prior to HBOT Patient Contradiction and Pre Checklist Verified and Signed prior to HBOT Patient expressed comfort and denied pain  Patient's Vitals were within parameters for HBOT Patient descended to 2.4 NAVARRO @ 2.2 PSI/MIN without incident in chamber 4 Patient Resting @ depth and observed for with chest rise, facial twitching and any discomfort chamber side. PT CARE TRANSFERRED TO CHT PT TOLERATED AIR BREAKS PT WAS DEPRESSURIZED FROM TX PRESSURE OF 2.4 NAVARRO @ 2.2 PSI/MIN PT TOLERATED TREATMENT PT EXITED HYPERBARIC SUITE SAFELY ACCOMPANIED BY CHT

## 2025-06-11 NOTE — ADDENDUM
[FreeTextEntry1] : PT ALERT AND ENTERED INTO HYPERBARIC SUITE PT ORDER VERIFIED PRIOR TO TREATMENT PT CONTRAINDICATION LIST AND PRE-DIVE SAFETY CHECK DONE AND VERIFIED PRIOR TO TREATMENT BY TWO CHT PT GROUND BRACLET VERIFIED PLUGGED IN BY TWO TECHNICIANS PT VITALS WERE WITHIN PARAMETERS FOR HBOT PT DENIES PAIN PT WAS PRESSURIZED TO 2.4 NAVARRO AT 2.2 PSI/MIN IN CHAMBER #4 PT OBSERVED FOR FACIAL TWITCHING AND CHEST RISE BY CHT SEATED CHAMBERSIDE Patient Tolerated Air Brakes  Patient Ascended from 2.4 NAVARRO @ 2.2 PSI/MIN without incident in chamber Patient tolerated treatment Patient exited hyperbaric suit safely accompanied by CHT

## 2025-06-11 NOTE — PROCEDURE
[____] : Pre-Dive: Time - [unfilled] [___] : Pre-Dive: Value - [unfilled] mg/dL [Outpatient] : Outpatient [Ambulatory] : Patient is ambulatory. [THIS CHAMBER HAS BEEN CLEANED / DISINFECTED] : This chamber has been cleaned / disinfected according to local and hospital policy and procedure prior to this treatment. [Patient demonstrated and verbalized proper technique for using air break mask] : Patient demonstrated and verbalized proper technique for using air break mask [Patient educated on the risks of CONSUMING ALCOHOL prior to HBOT with understanding] : Patient educated on the risks of CONSUMING ALCOHOL prior to HBOT with understanding [Patient educated on the risks of SMOKING prior to HBOT with understanding] : Patient educated on the risks of SMOKING prior to HBOT with understanding [100% Cotton] : 100% cotton [Empty all pockets] : empty all pockets [No hair oils, wigs, hairpieces, pins] : no hair oils, wigs, hairpieces, pins  [Pre tx medications] : pre tx medications  [No make-up, creams] : no make-up, creams  [No jewelry] : no jewelry  [No matches, cigarettes, lighters] : no matches, cigarettes, lighters  [Hearing aid removed] : hearing aid removed [Dentures removed] : dentures removed [Contacts removed] : contacts removed  [Ground bracelet on pt's wrist] : ground bracelet on pt's wrist  [Remove nail polish] : remove nail polish  [No reading material] : no reading material  [Bra, undergarments removed] : bra, undergarments removed  [No contraindicated dressings] : no contraindicated dressings [Ground Wire - VISUAL Verification - Intact/Free of Obstruction] : Ground Wire - VISUAL Verification - Intact/Free of Obstruction  [Number: ___] : Number: [unfilled] [Diagnosis: ___] : Diagnosis: [unfilled] [Clear all fields] : clear all fields [] : No [FreeTextEntry2] : Chamber #4 [FreeTextEntry4] : 100 [FreeTextEntry8] : 5408 [FreeTextEntry6] : 0411 [de-identified] : 5485 [de-identified] : 8608 [de-identified] : 9805 [de-identified] : 2879 [de-identified] : 7760 [de-identified] : 1000 [de-identified] : 120

## 2025-06-12 NOTE — PROCEDURE
[Outpatient] : Outpatient [Ambulatory] : Patient is ambulatory. [THIS CHAMBER HAS BEEN CLEANED / DISINFECTED] : This chamber has been cleaned / disinfected according to local and hospital policy and procedure prior to this treatment. [____] : Pre-Dive: Time - [unfilled] [___] : Pre-Dive: Value - [unfilled] mg/dL [Patient demonstrated and verbalized proper technique for using air break mask] : Patient demonstrated and verbalized proper technique for using air break mask [Patient educated on the risks of SMOKING prior to HBOT with understanding] : Patient educated on the risks of SMOKING prior to HBOT with understanding [Patient educated on the risks of CONSUMING ALCOHOL prior to HBOT with understanding] : Patient educated on the risks of CONSUMING ALCOHOL prior to HBOT with understanding [100% Cotton] : 100% cotton [Empty all pockets] : empty all pockets [No hair oils, wigs, hairpieces, pins] : no hair oils, wigs, hairpieces, pins  [Pre tx medications] : pre tx medications  [No jewelry] : no jewelry  [No make-up, creams] : no make-up, creams  [No matches, cigarettes, lighters] : no matches, cigarettes, lighters  [Hearing aid removed] : hearing aid removed [Dentures removed] : dentures removed [Ground bracelet on pt's wrist] : ground bracelet on pt's wrist  [Contacts removed] : contacts removed  [Remove nail polish] : remove nail polish  [No reading material] : no reading material  [Bra, undergarments removed] : bra, undergarments removed  [No contraindicated dressings] : no contraindicated dressings [Ground Wire - VISUAL Verification - Intact/Free of Obstruction] : Ground Wire - VISUAL Verification - Intact/Free of Obstruction  [Number: ___] : Number: [unfilled] [Diagnosis: ___] : Diagnosis: [unfilled] [Clear all fields] : clear all fields [] : No [FreeTextEntry4] : 100 [FreeTextEntry6] : 9763 [FreeTextEntry8] : 7071 [de-identified] : 4877 [de-identified] : 0629 [de-identified] : 1078 [de-identified] : 1633 [de-identified] : 8559 [de-identified] : 1006 [de-identified] : 120 MIN

## 2025-06-13 NOTE — ADDENDUM
[FreeTextEntry1] : Patient Entered Hyperbaric Room Alert and Ambulatory Patient Vitals Within Normal Limits for HBO Tx Patient Contraindication List and Safety Check Completed and Verified by two CHTs Patient Denies Any Pain Patient Orders Verified Prior to Descent Patient Descended to 2.4 NAVARRO at a Rate of 2.2 PSI/min in CHAMBER #2 Patient Observed for Chest Rise, Facial Twitching, and Any Discomfort Throughout Tx  Patient Tolerated Both Air Breaks Well Patient Ascended to Surface Without Any Complications Patient Vitals Were Taken and Were Within Limits for Patient Post TX Patient Exited Hyperbaric Room Assisted by CHT

## 2025-06-13 NOTE — PROCEDURE
[Outpatient] : Outpatient [Ambulatory] : Patient is ambulatory. [THIS CHAMBER HAS BEEN CLEANED / DISINFECTED] : This chamber has been cleaned / disinfected according to local and hospital policy and procedure prior to this treatment. [____] : Pre-Dive: Time - [unfilled] [___] : Pre-Dive: Value - [unfilled] mg/dL [Patient demonstrated and verbalized proper technique for using air break mask] : Patient demonstrated and verbalized proper technique for using air break mask [Patient educated on the risks of SMOKING prior to HBOT with understanding] : Patient educated on the risks of SMOKING prior to HBOT with understanding [Patient educated on the risks of CONSUMING ALCOHOL prior to HBOT with understanding] : Patient educated on the risks of CONSUMING ALCOHOL prior to HBOT with understanding [100% Cotton] : 100% cotton [Empty all pockets] : empty all pockets [No hair oils, wigs, hairpieces, pins] : no hair oils, wigs, hairpieces, pins  [Pre tx medications] : pre tx medications  [No make-up, creams] : no make-up, creams  [No jewelry] : no jewelry  [No matches, cigarettes, lighters] : no matches, cigarettes, lighters  [Hearing aid removed] : hearing aid removed [Dentures removed] : dentures removed [Ground bracelet on pt's wrist] : ground bracelet on pt's wrist  [Contacts removed] : contacts removed  [Remove nail polish] : remove nail polish  [No reading material] : no reading material  [Bra, undergarments removed] : bra, undergarments removed  [No contraindicated dressings] : no contraindicated dressings [Ground Wire - VISUAL Verification - Intact/Free of Obstruction] : Ground Wire - VISUAL Verification - Intact/Free of Obstruction  [Number: ___] : Number: [unfilled] [Diagnosis: ___] : Diagnosis: [unfilled] [Clear all fields] : clear all fields [] : No [FreeTextEntry2] : Chamber 2 [FreeTextEntry4] : 100 [FreeTextEntry6] : 4543 [FreeTextEntry8] : 5083 [de-identified] : 9007 [de-identified] : 4697 [de-identified] : 7664 [de-identified] : 0951 [de-identified] : 1014 [de-identified] : 1002 [de-identified] : 120

## 2025-06-13 NOTE — ASSESSMENT

## 2025-06-14 NOTE — PROCEDURE
[Outpatient] : Outpatient [Ambulatory] : Patient is ambulatory. [THIS CHAMBER HAS BEEN CLEANED / DISINFECTED] : This chamber has been cleaned / disinfected according to local and hospital policy and procedure prior to this treatment. [____] : Pre-Dive: Time - [unfilled] [___] : Pre-Dive: Value - [unfilled] mg/dL [Patient demonstrated and verbalized proper technique for using air break mask] : Patient demonstrated and verbalized proper technique for using air break mask [Patient educated on the risks of SMOKING prior to HBOT with understanding] : Patient educated on the risks of SMOKING prior to HBOT with understanding [Patient educated on the risks of CONSUMING ALCOHOL prior to HBOT with understanding] : Patient educated on the risks of CONSUMING ALCOHOL prior to HBOT with understanding [100% Cotton] : 100% cotton [Empty all pockets] : empty all pockets [No hair oils, wigs, hairpieces, pins] : no hair oils, wigs, hairpieces, pins  [Pre tx medications] : pre tx medications  [No make-up, creams] : no make-up, creams  [No jewelry] : no jewelry  [No matches, cigarettes, lighters] : no matches, cigarettes, lighters  [Hearing aid removed] : hearing aid removed [Dentures removed] : dentures removed [Ground bracelet on pt's wrist] : ground bracelet on pt's wrist  [Contacts removed] : contacts removed  [Remove nail polish] : remove nail polish  [No reading material] : no reading material  [Bra, undergarments removed] : bra, undergarments removed  [No contraindicated dressings] : no contraindicated dressings [Ground Wire - VISUAL Verification - Intact/Free of Obstruction] : Ground Wire - VISUAL Verification - Intact/Free of Obstruction  [Number: ___] : Number: [unfilled] [Diagnosis: ___] : Diagnosis: [unfilled] [Clear all fields] : clear all fields [] : No [FreeTextEntry2] : CHAMBER 2 [FreeTextEntry4] : 100 [FreeTextEneah6] : 288 [FreeTextEntry8] : 205 [de-identified] : 064 [de-identified] : 316 [de-identified] : 938 [de-identified] : 640 [de-identified] : 763 [de-identified] : 054 [de-identified] : 120

## 2025-06-18 NOTE — ADDENDUM
[FreeTextEntry1] : PT ALERT AND ENTERED INTO HYPERBARIC SUITE PT ORDER VERIFIED PRIOR TO TREATMENT PT CONTRAINDICATION LIST AND PRE-DIVE SAFETY CHECK DONE AND VERIFIED PRIOR TO TREATMENT BY CHT & CHRN PT GROUND BRACLET VERIFIED PLUGGED IN BY TWO TECHNICIANS PT VITALS WERE WITHIN PARAMETERS FOR HBOT PT DENIES PAIN PT WAS PRESSURIZED TO 2.4 NAVARRO AT 2.2 PSI/MIN IN CHAMBER # PT OBSERVED FOR FACIAL TWITCHING AND CHEST RISE BY CHT SEATED CHAMBERSIDE PT TOLERATED AIR BREAKS PT WAS DEPRESSURIZED FROM TX PRESSURE OF 2.4 NAVARRO @ 2.2 PSI/MIN PT TOLERATED TREATMENT PT EXITED HYPERBARIC SUITE SAFELY ACCOMPANIED BY CHT

## 2025-06-18 NOTE — PROCEDURE
[Outpatient] : Outpatient [Ambulatory] : Patient is ambulatory. [THIS CHAMBER HAS BEEN CLEANED / DISINFECTED] : This chamber has been cleaned / disinfected according to local and hospital policy and procedure prior to this treatment. [____] : Pre-Dive: Time - [unfilled] [___] : Pre-Dive: Value - [unfilled] mg/dL [Patient demonstrated and verbalized proper technique for using air break mask] : Patient demonstrated and verbalized proper technique for using air break mask [Patient educated on the risks of SMOKING prior to HBOT with understanding] : Patient educated on the risks of SMOKING prior to HBOT with understanding [Patient educated on the risks of CONSUMING ALCOHOL prior to HBOT with understanding] : Patient educated on the risks of CONSUMING ALCOHOL prior to HBOT with understanding [100% Cotton] : 100% cotton [Empty all pockets] : empty all pockets [No hair oils, wigs, hairpieces, pins] : no hair oils, wigs, hairpieces, pins  [Pre tx medications] : pre tx medications  [No make-up, creams] : no make-up, creams  [No jewelry] : no jewelry  [No matches, cigarettes, lighters] : no matches, cigarettes, lighters  [Hearing aid removed] : hearing aid removed [Dentures removed] : dentures removed [Ground bracelet on pt's wrist] : ground bracelet on pt's wrist  [Contacts removed] : contacts removed  [Remove nail polish] : remove nail polish  [No reading material] : no reading material  [Bra, undergarments removed] : bra, undergarments removed  [No contraindicated dressings] : no contraindicated dressings [Ground Wire - VISUAL Verification - Intact/Free of Obstruction] : Ground Wire - VISUAL Verification - Intact/Free of Obstruction  [Number: ___] : Number: [unfilled] [Diagnosis: ___] : Diagnosis: [unfilled] [Clear all fields] : clear all fields [] : No [FreeTextEntry4] : 100 [FreeTextEntry6] : 5934 [FreeTextEntry8] : 7120 [de-identified] : 0312 [de-identified] : 5659 [de-identified] : 0342 [de-identified] : 6293 [de-identified] : 0923 [de-identified] : 1008 [de-identified] : 120 MIN

## 2025-06-18 NOTE — PROCEDURE
[Outpatient] : Outpatient [Ambulatory] : Patient is ambulatory. [THIS CHAMBER HAS BEEN CLEANED / DISINFECTED] : This chamber has been cleaned / disinfected according to local and hospital policy and procedure prior to this treatment. [____] : Pre-Dive: Time - [unfilled] [___] : Pre-Dive: Value - [unfilled] mg/dL [Patient demonstrated and verbalized proper technique for using air break mask] : Patient demonstrated and verbalized proper technique for using air break mask [Patient educated on the risks of SMOKING prior to HBOT with understanding] : Patient educated on the risks of SMOKING prior to HBOT with understanding [Patient educated on the risks of CONSUMING ALCOHOL prior to HBOT with understanding] : Patient educated on the risks of CONSUMING ALCOHOL prior to HBOT with understanding [100% Cotton] : 100% cotton [Empty all pockets] : empty all pockets [No hair oils, wigs, hairpieces, pins] : no hair oils, wigs, hairpieces, pins  [Pre tx medications] : pre tx medications  [No make-up, creams] : no make-up, creams  [No jewelry] : no jewelry  [No matches, cigarettes, lighters] : no matches, cigarettes, lighters  [Hearing aid removed] : hearing aid removed [Dentures removed] : dentures removed [Ground bracelet on pt's wrist] : ground bracelet on pt's wrist  [Contacts removed] : contacts removed  [Remove nail polish] : remove nail polish  [No reading material] : no reading material  [Bra, undergarments removed] : bra, undergarments removed  [No contraindicated dressings] : no contraindicated dressings [Ground Wire - VISUAL Verification - Intact/Free of Obstruction] : Ground Wire - VISUAL Verification - Intact/Free of Obstruction  [Number: ___] : Number: [unfilled] [Diagnosis: ___] : Diagnosis: [unfilled] [Clear all fields] : clear all fields [] : No [FreeTextEntry4] : 100 [FreeTextEntry6] : 5790 [FreeTextEntry8] : 1480 [de-identified] : 6411 [de-identified] : 1297 [de-identified] : 8431 [de-identified] : 8884 [de-identified] : 3310 [de-identified] : 1004 [de-identified] : 120 MIN

## 2025-06-19 NOTE — PROCEDURE
[Outpatient] : Outpatient [Ambulatory] : Patient is ambulatory. [THIS CHAMBER HAS BEEN CLEANED / DISINFECTED] : This chamber has been cleaned / disinfected according to local and hospital policy and procedure prior to this treatment. [____] : Pre-Dive: Time - [unfilled] [___] : Pre-Dive: Value - [unfilled] mg/dL [Patient demonstrated and verbalized proper technique for using air break mask] : Patient demonstrated and verbalized proper technique for using air break mask [Patient educated on the risks of SMOKING prior to HBOT with understanding] : Patient educated on the risks of SMOKING prior to HBOT with understanding [Patient educated on the risks of CONSUMING ALCOHOL prior to HBOT with understanding] : Patient educated on the risks of CONSUMING ALCOHOL prior to HBOT with understanding [100% Cotton] : 100% cotton [Empty all pockets] : empty all pockets [No hair oils, wigs, hairpieces, pins] : no hair oils, wigs, hairpieces, pins  [Pre tx medications] : pre tx medications  [No make-up, creams] : no make-up, creams  [No jewelry] : no jewelry  [No matches, cigarettes, lighters] : no matches, cigarettes, lighters  [Hearing aid removed] : hearing aid removed [Dentures removed] : dentures removed [Ground bracelet on pt's wrist] : ground bracelet on pt's wrist  [Contacts removed] : contacts removed  [Remove nail polish] : remove nail polish  [No reading material] : no reading material  [Bra, undergarments removed] : bra, undergarments removed  [No contraindicated dressings] : no contraindicated dressings [Ground Wire - VISUAL Verification - Intact/Free of Obstruction] : Ground Wire - VISUAL Verification - Intact/Free of Obstruction  [Number: ___] : Number: [unfilled] [Diagnosis: ___] : Diagnosis: [unfilled] [Clear all fields] : clear all fields [] : No [FreeTextEntry2] : Chamber 2 [FreeTextEntry4] : 100 [FreeTextEntry6] : 0878 [FreeTextEntry8] : 1610 [de-identified] : 1267 [de-identified] : 3070 [de-identified] : 6341 [de-identified] : 7758 [de-identified] : 3784 [de-identified] : 1002 [de-identified] : 120

## 2025-06-23 NOTE — PROCEDURE
[Outpatient] : Outpatient [Ambulatory] : Patient is ambulatory. [THIS CHAMBER HAS BEEN CLEANED / DISINFECTED] : This chamber has been cleaned / disinfected according to local and hospital policy and procedure prior to this treatment. [____] : Pre-Dive: Time - [unfilled] [___] : Pre-Dive: Value - [unfilled] mg/dL [Patient demonstrated and verbalized proper technique for using air break mask] : Patient demonstrated and verbalized proper technique for using air break mask [Patient educated on the risks of SMOKING prior to HBOT with understanding] : Patient educated on the risks of SMOKING prior to HBOT with understanding [Patient educated on the risks of CONSUMING ALCOHOL prior to HBOT with understanding] : Patient educated on the risks of CONSUMING ALCOHOL prior to HBOT with understanding [100% Cotton] : 100% cotton [Empty all pockets] : empty all pockets [No hair oils, wigs, hairpieces, pins] : no hair oils, wigs, hairpieces, pins  [Pre tx medications] : pre tx medications  [No make-up, creams] : no make-up, creams  [No jewelry] : no jewelry  [No matches, cigarettes, lighters] : no matches, cigarettes, lighters  [Hearing aid removed] : hearing aid removed [Dentures removed] : dentures removed [Ground bracelet on pt's wrist] : ground bracelet on pt's wrist  [Contacts removed] : contacts removed  [Remove nail polish] : remove nail polish  [No reading material] : no reading material  [Bra, undergarments removed] : bra, undergarments removed  [No contraindicated dressings] : no contraindicated dressings [Ground Wire - VISUAL Verification - Intact/Free of Obstruction] : Ground Wire - VISUAL Verification - Intact/Free of Obstruction  [Number: ___] : Number: [unfilled] [Diagnosis: ___] : Diagnosis: [unfilled] [Clear all fields] : clear all fields [] : No [FreeTextEntry4] : 100 MINS  [FreeTextEntry6] : 6724 [FreeTextEntry8] : 0959 [de-identified] : 3199 [de-identified] : 6636 [de-identified] : 3451 [de-identified] : 0919 [de-identified] : 5643 [de-identified] : 1000 [de-identified] : 120 MINs

## 2025-06-23 NOTE — ADDENDUM
[FreeTextEntry1] : PT ALERT AND ENTERED INTO HYPERBARIC SUITE PT ORDER VERIFIED PRIOR TO TREATMENT PT CONTRAINDICATION LIST AND PRE-DIVE SAFETY CHECK DONE AND VERIFIED PRIOR TO TREATMENT BY TWO CHT PT GROUND BRACLET VERIFIED PLUGGED IN BY TWO TECHNICIANS PT VITALS WERE WITHIN PARAMETERS FOR HBOT PT DENIES PAIN PT WAS PRESSURIZED TO 2.4 NAVARRO AT 2.2 PSI/MIN IN CHAMBER #2 PT OBSERVED FOR FACIAL TWITCHING AND CHEST RISE BY CHT SEATED CHAMBERSIDE Patient Tolerated Air Brakes  Patient Ascended from 2.4 NAVARRO @ 2.2 PSI/MIN without incident in chamber 2 Patient tolerated treatment Patient exited hyperbaric suit safely accompanied by CHT

## 2025-06-24 NOTE — PROCEDURE
[Outpatient] : Outpatient [Ambulatory] : Patient is ambulatory. [THIS CHAMBER HAS BEEN CLEANED / DISINFECTED] : This chamber has been cleaned / disinfected according to local and hospital policy and procedure prior to this treatment. [____] : Pre-Dive: Time - [unfilled] [___] : Pre-Dive: Value - [unfilled] mg/dL [Patient demonstrated and verbalized proper technique for using air break mask] : Patient demonstrated and verbalized proper technique for using air break mask [Patient educated on the risks of SMOKING prior to HBOT with understanding] : Patient educated on the risks of SMOKING prior to HBOT with understanding [Patient educated on the risks of CONSUMING ALCOHOL prior to HBOT with understanding] : Patient educated on the risks of CONSUMING ALCOHOL prior to HBOT with understanding [100% Cotton] : 100% cotton [Empty all pockets] : empty all pockets [No hair oils, wigs, hairpieces, pins] : no hair oils, wigs, hairpieces, pins  [Pre tx medications] : pre tx medications  [No make-up, creams] : no make-up, creams  [No jewelry] : no jewelry  [No matches, cigarettes, lighters] : no matches, cigarettes, lighters  [Hearing aid removed] : hearing aid removed [Dentures removed] : dentures removed [Ground bracelet on pt's wrist] : ground bracelet on pt's wrist  [Contacts removed] : contacts removed  [Remove nail polish] : remove nail polish  [No reading material] : no reading material  [Bra, undergarments removed] : bra, undergarments removed  [No contraindicated dressings] : no contraindicated dressings [Ground Wire - VISUAL Verification - Intact/Free of Obstruction] : Ground Wire - VISUAL Verification - Intact/Free of Obstruction  [Number: ___] : Number: [unfilled] [Diagnosis: ___] : Diagnosis: [unfilled] [Clear all fields] : clear all fields [] : No [FreeTextEntry4] : 100 [FreeTextEntry6] : 6856 [FreeTextEntry8] : 9913 [de-identified] : 4042 [de-identified] : 1193 [de-identified] : 0935 [de-identified] : 4268 [de-identified] : 8673 [de-identified] : 0979 [de-identified] : 120

## 2025-06-25 NOTE — ASSESSMENT

## 2025-06-25 NOTE — PROCEDURE
[Outpatient] : Outpatient [Ambulatory] : Patient is ambulatory. [THIS CHAMBER HAS BEEN CLEANED / DISINFECTED] : This chamber has been cleaned / disinfected according to local and hospital policy and procedure prior to this treatment. [____] : Pre-Dive: Time - [unfilled] [___] : Pre-Dive: Value - [unfilled] mg/dL [Patient demonstrated and verbalized proper technique for using air break mask] : Patient demonstrated and verbalized proper technique for using air break mask [Patient educated on the risks of SMOKING prior to HBOT with understanding] : Patient educated on the risks of SMOKING prior to HBOT with understanding [Patient educated on the risks of CONSUMING ALCOHOL prior to HBOT with understanding] : Patient educated on the risks of CONSUMING ALCOHOL prior to HBOT with understanding [100% Cotton] : 100% cotton [Empty all pockets] : empty all pockets [No hair oils, wigs, hairpieces, pins] : no hair oils, wigs, hairpieces, pins  [Pre tx medications] : pre tx medications  [No make-up, creams] : no make-up, creams  [No jewelry] : no jewelry  [No matches, cigarettes, lighters] : no matches, cigarettes, lighters  [Hearing aid removed] : hearing aid removed [Dentures removed] : dentures removed [Ground bracelet on pt's wrist] : ground bracelet on pt's wrist  [Contacts removed] : contacts removed  [Remove nail polish] : remove nail polish  [No reading material] : no reading material  [Bra, undergarments removed] : bra, undergarments removed  [No contraindicated dressings] : no contraindicated dressings [Ground Wire - VISUAL Verification - Intact/Free of Obstruction] : Ground Wire - VISUAL Verification - Intact/Free of Obstruction  [Diagnosis: ___] : Diagnosis: [unfilled] [Number: ___] : Number: [unfilled] [Clear all fields] : clear all fields [] : No [FreeTextEntry4] : 100 [FreeTextEntry6] : 7851 [FreeTextEntry8] : 3806 [de-identified] : 4919 [de-identified] : 1526 [de-identified] : 4877 [de-identified] : 1583 [de-identified] : 0918 [de-identified] : 1004 [de-identified] : 120 MIN

## 2025-06-26 NOTE — PROCEDURE
[Outpatient] : Outpatient [Ambulatory] : Patient is ambulatory. [THIS CHAMBER HAS BEEN CLEANED / DISINFECTED] : This chamber has been cleaned / disinfected according to local and hospital policy and procedure prior to this treatment. [____] : Pre-Dive: Time - [unfilled] [___] : Pre-Dive: Value - [unfilled] mg/dL [Patient demonstrated and verbalized proper technique for using air break mask] : Patient demonstrated and verbalized proper technique for using air break mask [Patient educated on the risks of SMOKING prior to HBOT with understanding] : Patient educated on the risks of SMOKING prior to HBOT with understanding [Patient educated on the risks of CONSUMING ALCOHOL prior to HBOT with understanding] : Patient educated on the risks of CONSUMING ALCOHOL prior to HBOT with understanding [100% Cotton] : 100% cotton [Empty all pockets] : empty all pockets [No hair oils, wigs, hairpieces, pins] : no hair oils, wigs, hairpieces, pins  [Pre tx medications] : pre tx medications  [No make-up, creams] : no make-up, creams  [No jewelry] : no jewelry  [No matches, cigarettes, lighters] : no matches, cigarettes, lighters  [Hearing aid removed] : hearing aid removed [Dentures removed] : dentures removed [Ground bracelet on pt's wrist] : ground bracelet on pt's wrist  [Contacts removed] : contacts removed  [Remove nail polish] : remove nail polish  [No reading material] : no reading material  [Bra, undergarments removed] : bra, undergarments removed  [No contraindicated dressings] : no contraindicated dressings [Ground Wire - VISUAL Verification - Intact/Free of Obstruction] : Ground Wire - VISUAL Verification - Intact/Free of Obstruction  [Number: ___] : Number: [unfilled] [Diagnosis: ___] : Diagnosis: [unfilled] [Clear all fields] : clear all fields [] : No [FreeTextEntry4] : 100 [FreeTextEntry6] : 8999 [FreeTextEntry8] : 6823 [de-identified] : 5148 [de-identified] : 0858 [de-identified] : 0910 [de-identified] : 5820 [de-identified] : 7676 [de-identified] : 1002 [de-identified] : 120 MIN

## 2025-06-27 NOTE — PROCEDURE
[Outpatient] : Outpatient [Ambulatory] : Patient is ambulatory. [THIS CHAMBER HAS BEEN CLEANED / DISINFECTED] : This chamber has been cleaned / disinfected according to local and hospital policy and procedure prior to this treatment. [____] : Pre-Dive: Time - [unfilled] [___] : Pre-Dive: Value - [unfilled] mg/dL [Patient demonstrated and verbalized proper technique for using air break mask] : Patient demonstrated and verbalized proper technique for using air break mask [Patient educated on the risks of SMOKING prior to HBOT with understanding] : Patient educated on the risks of SMOKING prior to HBOT with understanding [Patient educated on the risks of CONSUMING ALCOHOL prior to HBOT with understanding] : Patient educated on the risks of CONSUMING ALCOHOL prior to HBOT with understanding [100% Cotton] : 100% cotton [Empty all pockets] : empty all pockets [No hair oils, wigs, hairpieces, pins] : no hair oils, wigs, hairpieces, pins  [Pre tx medications] : pre tx medications  [No make-up, creams] : no make-up, creams  [No jewelry] : no jewelry  [No matches, cigarettes, lighters] : no matches, cigarettes, lighters  [Hearing aid removed] : hearing aid removed [Dentures removed] : dentures removed [Ground bracelet on pt's wrist] : ground bracelet on pt's wrist  [Contacts removed] : contacts removed  [Remove nail polish] : remove nail polish  [No reading material] : no reading material  [Bra, undergarments removed] : bra, undergarments removed  [No contraindicated dressings] : no contraindicated dressings [Ground Wire - VISUAL Verification - Intact/Free of Obstruction] : Ground Wire - VISUAL Verification - Intact/Free of Obstruction  [Number: ___] : Number: [unfilled] [Diagnosis: ___] : Diagnosis: [unfilled] [Clear all fields] : clear all fields [] : No [FreeTextEntry2] : CHAMBER 4 [FreeTextEntry4] : 100 [FreeTextEntry6] : 1984 [FreeTextEntry8] : 7297 [de-identified] : 2962 [de-identified] : 3516 [de-identified] : 0961 [de-identified] : 1859 [de-identified] : 3257 [de-identified] : 0935 [de-identified] : 120 MIN

## 2025-06-27 NOTE — ASSESSMENT

## 2025-06-27 NOTE — ADDENDUM
[FreeTextEntry1] : Pt arrived to HBOT suite ambulatory Pt order verified prior to tx Pt vitals within acceptable limits Pt BGL low. RN and MD notified. Pt given 2 juices. As per MD Pt is allowed to be treated without retest Pt BGL within acceptable limits Pt checked by 2 technicians Pt denies any pain or discomfort Pt descended to 2.4 NAVARRO @2.2 psi/min Pt @ depth without incident Pt resting comfortably with visible chest rise CARE TRANSFERRED TO CHT  PT TOLERATED AIR BREAKS PT WAS DEPRESSURIZED FROM TX PRESSURE OF 2.4 NAVARRO @ 2.2 PSI/MIN PT TOLERATED TREATMENT PT EXITED HYPERBARIC SUITE SAFELY ACCOMPANIED BY CHT

## 2025-06-28 NOTE — PROCEDURE
[Outpatient] : Outpatient [Ambulatory] : Patient is ambulatory. [THIS CHAMBER HAS BEEN CLEANED / DISINFECTED] : This chamber has been cleaned / disinfected according to local and hospital policy and procedure prior to this treatment. [____] : Pre-Dive: Time - [unfilled] [___] : Pre-Dive: Value - [unfilled] mg/dL [Patient demonstrated and verbalized proper technique for using air break mask] : Patient demonstrated and verbalized proper technique for using air break mask [Patient educated on the risks of SMOKING prior to HBOT with understanding] : Patient educated on the risks of SMOKING prior to HBOT with understanding [Patient educated on the risks of CONSUMING ALCOHOL prior to HBOT with understanding] : Patient educated on the risks of CONSUMING ALCOHOL prior to HBOT with understanding [100% Cotton] : 100% cotton [Empty all pockets] : empty all pockets [No hair oils, wigs, hairpieces, pins] : no hair oils, wigs, hairpieces, pins  [Pre tx medications] : pre tx medications  [No make-up, creams] : no make-up, creams  [No jewelry] : no jewelry  [No matches, cigarettes, lighters] : no matches, cigarettes, lighters  [Hearing aid removed] : hearing aid removed [Dentures removed] : dentures removed [Ground bracelet on pt's wrist] : ground bracelet on pt's wrist  [Contacts removed] : contacts removed  [Remove nail polish] : remove nail polish  [No reading material] : no reading material  [Bra, undergarments removed] : bra, undergarments removed  [No contraindicated dressings] : no contraindicated dressings [Ground Wire - VISUAL Verification - Intact/Free of Obstruction] : Ground Wire - VISUAL Verification - Intact/Free of Obstruction  [Number: ___] : Number: [unfilled] [Diagnosis: ___] : Diagnosis: [unfilled] [Clear all fields] : clear all fields [] : No [FreeTextEntry2] : chamber 2 [FreeTextEntry4] : 100 [FreeTextEntry6] : 0846 [FreeTextEntry8] : 0423 [de-identified] : 1285 [de-identified] : 2081 [de-identified] : 1532 [de-identified] : 7354 [de-identified] : 5469 [de-identified] : 0900 [de-identified] : 120

## 2025-06-28 NOTE — ADDENDUM
[FreeTextEntry1] : Patient Alert & Oriented Ambulatory  Patient's Order Verified Prior to HBOT Patient Contradiction and Pre Checklist Verified and Signed prior to HBOT Patient expressed comfort and denied pain  Patient's Vitals were within parameters for HBOT Patient descended to 2.4 NAVARRO @ 2.2 PSI/MIN without incident in chamber 2 Patient Resting @ depth and observed for with chest rise, facial twitching and any discomfort chamber side. Patient Tolerated Air Brakes  Patient Ascended from 2.5 NAVARRO @ 2.2 PSI/MIN without incident in chamber Patient tolerated treatment Patient exited hyperbaric suit safely accompanied by cht

## 2025-07-01 NOTE — HISTORY OF PRESENT ILLNESS
[FreeTextEntry1] : 62 yo WF, referred her here for assessment for HBO tx. Pt underwent bilateral mastectomy/LN dissection about one wk ago. Pt had been dx with invasive ductal carcinoma recently. A mammogram done 3 mos ago was wnl according to the pt. But recently pt felt a "olive size lump" of 2cm on her left bx and a bx was + for CA. Bx done only on the left side. A MRI done prior to the bx revealed a suspicious lesion on the left side, but wnl on the right.  During her surgery, pt had an anaphylactic shock to the blue dye used to map out LN. LN bx path still not back yet. Genetic studies done and were wnl accoridng to the pt.  Bilateral expanders were placed along with 2 ROMEL drains on both sides which are still in place. On her left ant. chest wall, the middle 1/3 of the incision appears ecchymotic as well as dusky c/w a compromise flap. No SOI.       Pt with a compromised flap on the left breast. Discussed HBO, its purpose and its procedure. Discussed potential risks/complications associated with HBO including ear barotrauma, nasal sinus pain, and Oxygen toxicity leading to seizures. Pt's hx and surgeries reviewed with pt in depth.  Orientation video to be shown to pt now. Time given for Q&A. 6/17/25 bilateral skin flaps show no signs of compromise at this time. Healing well. No SOI 7/1/25 No open wounds . heal well..No SOI

## 2025-07-01 NOTE — ASSESSMENT
[Verbal] : Verbal [Written] : Written [Demo] : Demo [Patient] : Patient [Spouse] : Spouse [Good - alert, interested, motivated] : Good - alert, interested, motivated [Verbalizes knowledge/Understanding] : Verbalizes knowledge/understanding [Dressing changes] : dressing changes [Skin Care] : skin care [Pressure relief] : pressure relief [Signs and symptoms of infection] : sign and symptoms of infection [Nutrition] : nutrition [How and When to Call] : how and when to call [Pain Management] : pain management [Hyperbaric Therapy] : hyperbaric therapy [Patient responsibility to plan of care] : patient responsibility to plan of care [Glycemic Control] : glycemic control [Stable] : stable [Home] : Home [Ambulatory] : Ambulatory [Not Applicable - Long Term Care/Home Health Agency] : Long Term Care/Home Health Agency: Not Applicable [] : No [FreeTextEntry2] : Infection prevention Pain management Glycemic control Weight reduction Foot and nail care Restore skin integrity Low sodium diet Ambulation safety Regular f/u with primary medical team HBOT Hygiene   [FreeTextEntry3] : Healed [FreeTextEntry4] : Pt has completed 26/30 HBOT, no additional treatments needed. As per the patient the surgeon said to complete all 30 treatments. Patient does not need to follow up for wound care after completing her 30 HBO treatments.

## 2025-07-01 NOTE — PLAN
[FreeTextEntry1] : compromised flaps bilateral breasts healing well continue HBO 4 more treatments left then continue follow up with her surgeon     time spent 25 mins.

## 2025-07-01 NOTE — PHYSICAL EXAM
[JVD] : no jugular venous distention  [Normal Thyroid] : the thyroid was normal [Normal Breath Sounds] : Normal breath sounds [Normal Heart Sounds] : normal heart sounds [Normal Rate and Rhythm] : normal rate and rhythm [Abdomen Masses] : No abdominal massess [Abdomen Tenderness] : ~T ~M No abdominal tenderness [Tender] : nontender [Enlarged] : not enlarged [Purpura] : purpura [Alert] : alert [Oriented to Person] : oriented to person [Oriented to Place] : oriented to place [Oriented to Time] : oriented to time [Calm] : calm [de-identified] : adult WF, NAD, alert, Ox3 [de-identified] : On otoscopic exam-bilateral intact eardrums with no hyperemia or fluid accumul. [FreeTextEntry1] : Right Breast flap- Closed [de-identified] : Giovanni Bra   [FreeTextEntry7] : Left Breast flap- Closed [de-identified] : Giovanni Bra   [TWNoteComboBox4] : None [TWNoteComboBox5] : No [TWNoteComboBox6] : Surgical [de-identified] : No [de-identified] : Normal [de-identified] : None [de-identified] : None [de-identified] : None [de-identified] : No [de-identified] : Surgical [de-identified] : No [de-identified] : Normal [de-identified] : None [de-identified] : None

## 2025-07-02 NOTE — ADDENDUM
[FreeTextEntry1] : Patient Alert & Oriented Ambulatory  Patient's Order Verified Prior to HBOT Patient Contradiction and Pre Checklist Verified and Signed prior to HBOT Patient expressed comfort and denied pain  Patient's Vitals were within parameters for HBOT Patient descended to 2.4 NAVARRO @ 2.2 PSI/MIN without incident in chamber 2 Patient Resting @ depth and observed for with chest rise, facial twitching and any discomfort chamber side. Patient Tolerated Air Brakes  Patient Ascended from 2.4 2 NAVARRO @ 2.2 PSI/MIN without incident in chamber 2 Patient tolerated treatment Patient exited hyperbaric suit safely accompanied by CHT

## 2025-07-02 NOTE — PROCEDURE
[Outpatient] : Outpatient [Ambulatory] : Patient is ambulatory. [THIS CHAMBER HAS BEEN CLEANED / DISINFECTED] : This chamber has been cleaned / disinfected according to local and hospital policy and procedure prior to this treatment. [____] : Post-Dive: Time - [unfilled] [___] : Post-Dive: Value - [unfilled] mg/dL [Patient demonstrated and verbalized proper technique for using air break mask] : Patient demonstrated and verbalized proper technique for using air break mask [Patient educated on the risks of SMOKING prior to HBOT with understanding] : Patient educated on the risks of SMOKING prior to HBOT with understanding [Patient educated on the risks of CONSUMING ALCOHOL prior to HBOT with understanding] : Patient educated on the risks of CONSUMING ALCOHOL prior to HBOT with understanding [100% Cotton] : 100% cotton [Empty all pockets] : empty all pockets [No hair oils, wigs, hairpieces, pins] : no hair oils, wigs, hairpieces, pins  [Pre tx medications] : pre tx medications  [No make-up, creams] : no make-up, creams  [No jewelry] : no jewelry  [No matches, cigarettes, lighters] : no matches, cigarettes, lighters  [Hearing aid removed] : hearing aid removed [Dentures removed] : dentures removed [Ground bracelet on pt's wrist] : ground bracelet on pt's wrist  [Contacts removed] : contacts removed  [Remove nail polish] : remove nail polish  [No reading material] : no reading material  [Bra, undergarments removed] : bra, undergarments removed  [No contraindicated dressings] : no contraindicated dressings [Ground Wire - VISUAL Verification - Intact/Free of Obstruction] : Ground Wire - VISUAL Verification - Intact/Free of Obstruction  [Number: ___] : Number: [unfilled] [Diagnosis: ___] : Diagnosis: [unfilled] [Clear all fields] : clear all fields [] : No [FreeTextEntry4] : 100 MINS  [FreeTextEntry6] : 3573 [FreeTextEntry8] : 7535 [de-identified] : 1791 [de-identified] : 4988 [de-identified] : 2666 [de-identified] : 5087 [de-identified] : 8183 [de-identified] : 1005 [de-identified] : 120 MINS

## 2025-07-03 NOTE — PROCEDURE
[Outpatient] : Outpatient [Ambulatory] : Patient is ambulatory. [THIS CHAMBER HAS BEEN CLEANED / DISINFECTED] : This chamber has been cleaned / disinfected according to local and hospital policy and procedure prior to this treatment. [____] : Pre-Dive: Time - [unfilled] [___] : Pre-Dive: Value - [unfilled] mg/dL [Patient demonstrated and verbalized proper technique for using air break mask] : Patient demonstrated and verbalized proper technique for using air break mask [Patient educated on the risks of SMOKING prior to HBOT with understanding] : Patient educated on the risks of SMOKING prior to HBOT with understanding [Patient educated on the risks of CONSUMING ALCOHOL prior to HBOT with understanding] : Patient educated on the risks of CONSUMING ALCOHOL prior to HBOT with understanding [100% Cotton] : 100% cotton [Empty all pockets] : empty all pockets [No hair oils, wigs, hairpieces, pins] : no hair oils, wigs, hairpieces, pins  [Pre tx medications] : pre tx medications  [No make-up, creams] : no make-up, creams  [No jewelry] : no jewelry  [No matches, cigarettes, lighters] : no matches, cigarettes, lighters  [Hearing aid removed] : hearing aid removed [Dentures removed] : dentures removed [Ground bracelet on pt's wrist] : ground bracelet on pt's wrist  [Contacts removed] : contacts removed  [Remove nail polish] : remove nail polish  [No reading material] : no reading material  [Bra, undergarments removed] : bra, undergarments removed  [No contraindicated dressings] : no contraindicated dressings [Ground Wire - VISUAL Verification - Intact/Free of Obstruction] : Ground Wire - VISUAL Verification - Intact/Free of Obstruction  [Number: ___] : Number: [unfilled] [Diagnosis: ___] : Diagnosis: [unfilled] [Clear all fields] : clear all fields [] : No [FreeTextEntry4] : 100 [FreeTextEntry6] : 8896 [FreeTextEntry8] : 1267 [de-identified] : 5684 [de-identified] : 7807 [de-identified] : 9533 [de-identified] : 1248 [de-identified] : 7290 [de-identified] : 0954 [de-identified] : 120 MIN

## 2025-07-07 NOTE — PROCEDURE
[Outpatient] : Outpatient [Ambulatory] : Patient is ambulatory. [THIS CHAMBER HAS BEEN CLEANED / DISINFECTED] : This chamber has been cleaned / disinfected according to local and hospital policy and procedure prior to this treatment. [____] : Pre-Dive: Time - [unfilled] [___] : Pre-Dive: Value - [unfilled] mg/dL [Patient demonstrated and verbalized proper technique for using air break mask] : Patient demonstrated and verbalized proper technique for using air break mask [Patient educated on the risks of SMOKING prior to HBOT with understanding] : Patient educated on the risks of SMOKING prior to HBOT with understanding [Patient educated on the risks of CONSUMING ALCOHOL prior to HBOT with understanding] : Patient educated on the risks of CONSUMING ALCOHOL prior to HBOT with understanding [100% Cotton] : 100% cotton [Empty all pockets] : empty all pockets [No hair oils, wigs, hairpieces, pins] : no hair oils, wigs, hairpieces, pins  [Pre tx medications] : pre tx medications  [No make-up, creams] : no make-up, creams  [No jewelry] : no jewelry  [No matches, cigarettes, lighters] : no matches, cigarettes, lighters  [Hearing aid removed] : hearing aid removed [Dentures removed] : dentures removed [Ground bracelet on pt's wrist] : ground bracelet on pt's wrist  [Contacts removed] : contacts removed  [Remove nail polish] : remove nail polish  [No reading material] : no reading material  [Bra, undergarments removed] : bra, undergarments removed  [No contraindicated dressings] : no contraindicated dressings [Ground Wire - VISUAL Verification - Intact/Free of Obstruction] : Ground Wire - VISUAL Verification - Intact/Free of Obstruction  [Number: ___] : Number: [unfilled] [Diagnosis: ___] : Diagnosis: [unfilled] [Clear all fields] : clear all fields [] : No [FreeTextEntry4] : 100 [FreeTextEntry6] : 4659 [FreeTextEntry8] : 2117 [de-identified] : 0668 [de-identified] : 5519 [de-identified] : 7243 [de-identified] : 4686 [de-identified] : 7127 [de-identified] : 1000 [de-identified] : 120 MIN

## 2025-07-08 NOTE — PROCEDURE
[Outpatient] : Outpatient [Ambulatory] : Patient is ambulatory. [THIS CHAMBER HAS BEEN CLEANED / DISINFECTED] : This chamber has been cleaned / disinfected according to local and hospital policy and procedure prior to this treatment. [____] : Pre-Dive: Time - [unfilled] [___] : Pre-Dive: Value - [unfilled] mg/dL [Patient demonstrated and verbalized proper technique for using air break mask] : Patient demonstrated and verbalized proper technique for using air break mask [Patient educated on the risks of SMOKING prior to HBOT with understanding] : Patient educated on the risks of SMOKING prior to HBOT with understanding [Patient educated on the risks of CONSUMING ALCOHOL prior to HBOT with understanding] : Patient educated on the risks of CONSUMING ALCOHOL prior to HBOT with understanding [100% Cotton] : 100% cotton [Empty all pockets] : empty all pockets [No hair oils, wigs, hairpieces, pins] : no hair oils, wigs, hairpieces, pins  [Pre tx medications] : pre tx medications  [No make-up, creams] : no make-up, creams  [No jewelry] : no jewelry  [No matches, cigarettes, lighters] : no matches, cigarettes, lighters  [Hearing aid removed] : hearing aid removed [Dentures removed] : dentures removed [Ground bracelet on pt's wrist] : ground bracelet on pt's wrist  [Contacts removed] : contacts removed  [Remove nail polish] : remove nail polish  [No reading material] : no reading material  [Bra, undergarments removed] : bra, undergarments removed  [No contraindicated dressings] : no contraindicated dressings [Ground Wire - VISUAL Verification - Intact/Free of Obstruction] : Ground Wire - VISUAL Verification - Intact/Free of Obstruction  [Number: ___] : Number: [unfilled] [Diagnosis: ___] : Diagnosis: [unfilled] [Clear all fields] : clear all fields [] : No [FreeTextEntry4] : 100 [FreeTextEntry6] : 7928 [FreeTextEntry8] : 4281 [de-identified] : 6356 [de-identified] : 2422 [de-identified] : 7545 [de-identified] : 4632 [de-identified] : 0710 [de-identified] : 0944 [de-identified] : 120 MIN